# Patient Record
Sex: FEMALE | Race: WHITE | NOT HISPANIC OR LATINO | Employment: FULL TIME | URBAN - METROPOLITAN AREA
[De-identification: names, ages, dates, MRNs, and addresses within clinical notes are randomized per-mention and may not be internally consistent; named-entity substitution may affect disease eponyms.]

---

## 2017-01-27 ENCOUNTER — COMMUNICATION - HEALTHEAST (OUTPATIENT)
Dept: INTERNAL MEDICINE | Facility: CLINIC | Age: 42
End: 2017-01-27

## 2017-03-17 ENCOUNTER — COMMUNICATION - HEALTHEAST (OUTPATIENT)
Dept: INTERNAL MEDICINE | Facility: CLINIC | Age: 42
End: 2017-03-17

## 2017-06-23 ENCOUNTER — COMMUNICATION - HEALTHEAST (OUTPATIENT)
Dept: INTERNAL MEDICINE | Facility: CLINIC | Age: 42
End: 2017-06-23

## 2017-08-10 ENCOUNTER — COMMUNICATION - HEALTHEAST (OUTPATIENT)
Dept: INTERNAL MEDICINE | Facility: CLINIC | Age: 42
End: 2017-08-10

## 2017-09-07 ENCOUNTER — COMMUNICATION - HEALTHEAST (OUTPATIENT)
Dept: INTERNAL MEDICINE | Facility: CLINIC | Age: 42
End: 2017-09-07

## 2017-09-14 ENCOUNTER — OFFICE VISIT - HEALTHEAST (OUTPATIENT)
Dept: INTERNAL MEDICINE | Facility: CLINIC | Age: 42
End: 2017-09-14

## 2017-09-14 DIAGNOSIS — L21.9 SEBORRHEIC DERMATITIS OF SCALP: ICD-10-CM

## 2017-09-14 DIAGNOSIS — F34.1 DYSTHYMIA: ICD-10-CM

## 2017-09-14 DIAGNOSIS — F41.1 GAD (GENERALIZED ANXIETY DISORDER): ICD-10-CM

## 2017-09-14 DIAGNOSIS — I10 HTN (HYPERTENSION): ICD-10-CM

## 2017-09-14 LAB
CHOLEST SERPL-MCNC: 206 MG/DL
FASTING STATUS PATIENT QL REPORTED: YES
HDLC SERPL-MCNC: 67 MG/DL
LDLC SERPL CALC-MCNC: 128 MG/DL
TRIGL SERPL-MCNC: 56 MG/DL

## 2017-09-19 ENCOUNTER — COMMUNICATION - HEALTHEAST (OUTPATIENT)
Dept: INTERNAL MEDICINE | Facility: CLINIC | Age: 42
End: 2017-09-19

## 2017-09-21 ENCOUNTER — COMMUNICATION - HEALTHEAST (OUTPATIENT)
Dept: INTERNAL MEDICINE | Facility: CLINIC | Age: 42
End: 2017-09-21

## 2017-11-23 ENCOUNTER — COMMUNICATION - HEALTHEAST (OUTPATIENT)
Dept: INTERNAL MEDICINE | Facility: CLINIC | Age: 42
End: 2017-11-23

## 2017-12-21 ENCOUNTER — COMMUNICATION - HEALTHEAST (OUTPATIENT)
Dept: INTERNAL MEDICINE | Facility: CLINIC | Age: 42
End: 2017-12-21

## 2018-03-25 ENCOUNTER — COMMUNICATION - HEALTHEAST (OUTPATIENT)
Dept: INTERNAL MEDICINE | Facility: CLINIC | Age: 43
End: 2018-03-25

## 2018-03-25 DIAGNOSIS — F34.1 DYSTHYMIA: ICD-10-CM

## 2018-06-12 ENCOUNTER — COMMUNICATION - HEALTHEAST (OUTPATIENT)
Dept: INTERNAL MEDICINE | Facility: CLINIC | Age: 43
End: 2018-06-12

## 2018-06-12 DIAGNOSIS — R60.9 IDIOPATHIC EDEMA: ICD-10-CM

## 2018-08-31 ENCOUNTER — COMMUNICATION - HEALTHEAST (OUTPATIENT)
Dept: INTERNAL MEDICINE | Facility: CLINIC | Age: 43
End: 2018-08-31

## 2018-08-31 DIAGNOSIS — R60.9 IDIOPATHIC EDEMA: ICD-10-CM

## 2019-03-26 ENCOUNTER — COMMUNICATION - HEALTHEAST (OUTPATIENT)
Dept: INTERNAL MEDICINE | Facility: CLINIC | Age: 44
End: 2019-03-26

## 2019-03-26 DIAGNOSIS — F34.1 DYSTHYMIA: ICD-10-CM

## 2019-05-13 ENCOUNTER — COMMUNICATION - HEALTHEAST (OUTPATIENT)
Dept: INTERNAL MEDICINE | Facility: CLINIC | Age: 44
End: 2019-05-13

## 2019-05-13 DIAGNOSIS — F34.1 DYSTHYMIA: ICD-10-CM

## 2019-06-24 ENCOUNTER — COMMUNICATION - HEALTHEAST (OUTPATIENT)
Dept: INTERNAL MEDICINE | Facility: CLINIC | Age: 44
End: 2019-06-24

## 2019-06-25 ENCOUNTER — OFFICE VISIT - HEALTHEAST (OUTPATIENT)
Dept: INTERNAL MEDICINE | Facility: CLINIC | Age: 44
End: 2019-06-25

## 2019-06-25 DIAGNOSIS — F41.1 GAD (GENERALIZED ANXIETY DISORDER): ICD-10-CM

## 2019-06-25 DIAGNOSIS — R60.9 IDIOPATHIC EDEMA: ICD-10-CM

## 2019-06-25 DIAGNOSIS — F34.1 DYSTHYMIA: ICD-10-CM

## 2019-06-25 LAB
ANION GAP SERPL CALCULATED.3IONS-SCNC: 12 MMOL/L (ref 5–18)
BUN SERPL-MCNC: 15 MG/DL (ref 8–22)
CALCIUM SERPL-MCNC: 10.5 MG/DL (ref 8.5–10.5)
CHLORIDE BLD-SCNC: 103 MMOL/L (ref 98–107)
CO2 SERPL-SCNC: 25 MMOL/L (ref 22–31)
CREAT SERPL-MCNC: 0.78 MG/DL (ref 0.6–1.1)
GFR SERPL CREATININE-BSD FRML MDRD: >60 ML/MIN/1.73M2
GLUCOSE BLD-MCNC: 114 MG/DL (ref 70–125)
POTASSIUM BLD-SCNC: 3.5 MMOL/L (ref 3.5–5)
SODIUM SERPL-SCNC: 140 MMOL/L (ref 136–145)

## 2019-12-15 ENCOUNTER — COMMUNICATION - HEALTHEAST (OUTPATIENT)
Dept: INTERNAL MEDICINE | Facility: CLINIC | Age: 44
End: 2019-12-15

## 2019-12-15 DIAGNOSIS — F34.1 DYSTHYMIA: ICD-10-CM

## 2019-12-17 ENCOUNTER — OFFICE VISIT - HEALTHEAST (OUTPATIENT)
Dept: INTERNAL MEDICINE | Facility: CLINIC | Age: 44
End: 2019-12-17

## 2019-12-17 DIAGNOSIS — L21.9 SEBORRHEIC DERMATITIS OF SCALP: ICD-10-CM

## 2019-12-17 DIAGNOSIS — R07.1 CHEST PAIN ON BREATHING: ICD-10-CM

## 2019-12-17 DIAGNOSIS — R60.9 IDIOPATHIC EDEMA: ICD-10-CM

## 2019-12-17 DIAGNOSIS — Z12.31 VISIT FOR SCREENING MAMMOGRAM: ICD-10-CM

## 2020-02-20 ENCOUNTER — OFFICE VISIT - HEALTHEAST (OUTPATIENT)
Dept: INTERNAL MEDICINE | Facility: CLINIC | Age: 45
End: 2020-02-20

## 2020-02-20 DIAGNOSIS — R60.9 IDIOPATHIC EDEMA: ICD-10-CM

## 2020-02-20 DIAGNOSIS — F41.1 GAD (GENERALIZED ANXIETY DISORDER): ICD-10-CM

## 2020-02-20 DIAGNOSIS — F34.1 DYSTHYMIA: ICD-10-CM

## 2020-02-20 DIAGNOSIS — Z00.00 ANNUAL PHYSICAL EXAM: ICD-10-CM

## 2020-02-20 LAB
ANION GAP SERPL CALCULATED.3IONS-SCNC: 11 MMOL/L (ref 5–18)
BUN SERPL-MCNC: 15 MG/DL (ref 8–22)
CALCIUM SERPL-MCNC: 9.7 MG/DL (ref 8.5–10.5)
CHLORIDE BLD-SCNC: 103 MMOL/L (ref 98–107)
CO2 SERPL-SCNC: 24 MMOL/L (ref 22–31)
CREAT SERPL-MCNC: 0.8 MG/DL (ref 0.6–1.1)
GFR SERPL CREATININE-BSD FRML MDRD: >60 ML/MIN/1.73M2
GLUCOSE BLD-MCNC: 95 MG/DL (ref 70–125)
POTASSIUM BLD-SCNC: 3.8 MMOL/L (ref 3.5–5)
SODIUM SERPL-SCNC: 138 MMOL/L (ref 136–145)

## 2020-02-20 ASSESSMENT — ANXIETY QUESTIONNAIRES
4. TROUBLE RELAXING: SEVERAL DAYS
6. BECOMING EASILY ANNOYED OR IRRITABLE: MORE THAN HALF THE DAYS
IF YOU CHECKED OFF ANY PROBLEMS ON THIS QUESTIONNAIRE, HOW DIFFICULT HAVE THESE PROBLEMS MADE IT FOR YOU TO DO YOUR WORK, TAKE CARE OF THINGS AT HOME, OR GET ALONG WITH OTHER PEOPLE: SOMEWHAT DIFFICULT
7. FEELING AFRAID AS IF SOMETHING AWFUL MIGHT HAPPEN: SEVERAL DAYS
1. FEELING NERVOUS, ANXIOUS, OR ON EDGE: SEVERAL DAYS
3. WORRYING TOO MUCH ABOUT DIFFERENT THINGS: SEVERAL DAYS
2. NOT BEING ABLE TO STOP OR CONTROL WORRYING: SEVERAL DAYS
5. BEING SO RESTLESS THAT IT IS HARD TO SIT STILL: SEVERAL DAYS
GAD7 TOTAL SCORE: 8

## 2020-02-20 ASSESSMENT — MIFFLIN-ST. JEOR: SCORE: 1269.11

## 2020-02-20 ASSESSMENT — PATIENT HEALTH QUESTIONNAIRE - PHQ9: SUM OF ALL RESPONSES TO PHQ QUESTIONS 1-9: 9

## 2020-02-21 LAB
HPV SOURCE: NORMAL
HUMAN PAPILLOMA VIRUS 16 DNA: NEGATIVE
HUMAN PAPILLOMA VIRUS 18 DNA: NEGATIVE
HUMAN PAPILLOMA VIRUS FINAL DIAGNOSIS: NORMAL
HUMAN PAPILLOMA VIRUS OTHER HR: NEGATIVE
SPECIMEN DESCRIPTION: NORMAL

## 2020-02-24 ENCOUNTER — COMMUNICATION - HEALTHEAST (OUTPATIENT)
Dept: INTERNAL MEDICINE | Facility: CLINIC | Age: 45
End: 2020-02-24

## 2020-02-26 LAB
BKR LAB AP ABNORMAL BLEEDING: NO
BKR LAB AP BIRTH CONTROL/HORMONES: NORMAL
BKR LAB AP CERVICAL APPEARANCE: NORMAL
BKR LAB AP GYN ADEQUACY: NORMAL
BKR LAB AP GYN INTERPRETATION: NORMAL
BKR LAB AP HPV REFLEX: NORMAL
BKR LAB AP LMP: NORMAL
BKR LAB AP PATIENT STATUS: NORMAL
BKR LAB AP PREVIOUS ABNORMAL: NORMAL
BKR LAB AP PREVIOUS NORMAL: 2014
HIGH RISK?: NO
PATH REPORT.COMMENTS IMP SPEC: NORMAL
RESULT FLAG (HE HISTORICAL CONVERSION): NORMAL

## 2020-02-27 ENCOUNTER — HOSPITAL ENCOUNTER (OUTPATIENT)
Dept: MAMMOGRAPHY | Facility: CLINIC | Age: 45
Discharge: HOME | End: 2020-02-27

## 2020-02-27 DIAGNOSIS — Z12.31 VISIT FOR SCREENING MAMMOGRAM: ICD-10-CM

## 2020-06-25 ENCOUNTER — COMMUNICATION - HEALTHEAST (OUTPATIENT)
Dept: INTERNAL MEDICINE | Facility: CLINIC | Age: 45
End: 2020-06-25

## 2020-06-25 DIAGNOSIS — L21.9 SEBORRHEIC DERMATITIS OF SCALP: ICD-10-CM

## 2020-06-25 RX ORDER — KETOCONAZOLE 20 MG/ML
SHAMPOO TOPICAL
Qty: 120 ML | Refills: 3 | Status: SHIPPED | OUTPATIENT
Start: 2020-06-25 | End: 2022-02-28

## 2020-12-31 ENCOUNTER — COMMUNICATION - HEALTHEAST (OUTPATIENT)
Dept: INTERNAL MEDICINE | Facility: CLINIC | Age: 45
End: 2020-12-31

## 2020-12-31 DIAGNOSIS — R60.9 IDIOPATHIC EDEMA: ICD-10-CM

## 2021-02-23 ENCOUNTER — COMMUNICATION - HEALTHEAST (OUTPATIENT)
Dept: INTERNAL MEDICINE | Facility: CLINIC | Age: 46
End: 2021-02-23

## 2021-02-23 DIAGNOSIS — F34.1 DYSTHYMIA: ICD-10-CM

## 2021-02-23 DIAGNOSIS — R60.9 IDIOPATHIC EDEMA: ICD-10-CM

## 2021-02-24 ENCOUNTER — OFFICE VISIT - HEALTHEAST (OUTPATIENT)
Dept: INTERNAL MEDICINE | Facility: CLINIC | Age: 46
End: 2021-02-24

## 2021-02-24 DIAGNOSIS — Z12.31 VISIT FOR SCREENING MAMMOGRAM: ICD-10-CM

## 2021-02-24 DIAGNOSIS — F41.1 GAD (GENERALIZED ANXIETY DISORDER): ICD-10-CM

## 2021-02-24 DIAGNOSIS — R60.9 IDIOPATHIC EDEMA: ICD-10-CM

## 2021-02-24 DIAGNOSIS — F34.1 DYSTHYMIA: ICD-10-CM

## 2021-02-24 RX ORDER — LORAZEPAM 0.5 MG/1
0.5 TABLET ORAL EVERY 8 HOURS PRN
Qty: 15 TABLET | Refills: 0 | Status: SHIPPED | OUTPATIENT
Start: 2021-02-24

## 2021-02-24 RX ORDER — TRIAMTERENE AND HYDROCHLOROTHIAZIDE 75; 50 MG/1; MG/1
1 TABLET ORAL DAILY
Qty: 90 TABLET | Refills: 3 | Status: SHIPPED | OUTPATIENT
Start: 2021-02-24 | End: 2022-02-23

## 2021-03-23 ENCOUNTER — AMBULATORY - HEALTHEAST (OUTPATIENT)
Dept: LAB | Facility: CLINIC | Age: 46
End: 2021-03-23

## 2021-03-23 DIAGNOSIS — R60.9 IDIOPATHIC EDEMA: ICD-10-CM

## 2021-03-23 LAB
ANION GAP SERPL CALCULATED.3IONS-SCNC: 10 MMOL/L (ref 5–18)
BUN SERPL-MCNC: 14 MG/DL (ref 8–22)
CALCIUM SERPL-MCNC: 9.1 MG/DL (ref 8.5–10.5)
CHLORIDE BLD-SCNC: 102 MMOL/L (ref 98–107)
CHOLEST SERPL-MCNC: 199 MG/DL
CO2 SERPL-SCNC: 24 MMOL/L (ref 22–31)
CREAT SERPL-MCNC: 0.79 MG/DL (ref 0.6–1.1)
FASTING STATUS PATIENT QL REPORTED: YES
GFR SERPL CREATININE-BSD FRML MDRD: >60 ML/MIN/1.73M2
GLUCOSE BLD-MCNC: 90 MG/DL (ref 70–125)
HDLC SERPL-MCNC: 65 MG/DL
LDLC SERPL CALC-MCNC: 112 MG/DL
POTASSIUM BLD-SCNC: 3.5 MMOL/L (ref 3.5–5)
SODIUM SERPL-SCNC: 136 MMOL/L (ref 136–145)
TRIGL SERPL-MCNC: 110 MG/DL

## 2021-05-26 ENCOUNTER — RECORDS - HEALTHEAST (OUTPATIENT)
Dept: ADMINISTRATIVE | Facility: CLINIC | Age: 46
End: 2021-05-26

## 2021-05-27 ASSESSMENT — PATIENT HEALTH QUESTIONNAIRE - PHQ9: SUM OF ALL RESPONSES TO PHQ QUESTIONS 1-9: 9

## 2021-05-27 NOTE — TELEPHONE ENCOUNTER
RN cannot approve Refill Request    RN can NOT refill this medication PCP messaged that patient is overdue for Office Visit.     Cecy Lutz, [facility] Triage/Med Refill 3/26/2019    Requested Prescriptions   Pending Prescriptions Disp Refills     sertraline (ZOLOFT) 50 MG tablet [Pharmacy Med Name: SERTRALINE 50MG TABLETS] 90 tablet 0     Sig: TAKE 1 TABLET(50 MG) BY MOUTH DAILY    [facility] Refill Protocol  Failed - 3/26/2019 10:46 AM       Failed - PCP or prescribing provider visit in last year    Last office visit with prescriber/PCP: 3/17/2016 Gabriel Connelly MD OR same dept: Visit date not found OR same specialty: 9/14/2017 Nelly Haque FNP  Last physical: Visit date not found Last MTM visit: Visit date not found   Next visit within 3 mo: Visit date not found  Next physical within 3 mo: Visit date not found  Prescriber OR PCP: Gabriel Connelly MD  Last diagnosis associated with med order: 1. Dysthymia  - sertraline (ZOLOFT) 50 MG tablet [Pharmacy Med Name: SERTRALINE 50MG TABLETS]; TAKE 1 TABLET(50 MG) BY MOUTH DAILY  Dispense: 90 tablet; Refill: 0    If protocol passes may refill for 12 months if within 3 months of last provider visit (or a total of 15 months).

## 2021-05-28 ASSESSMENT — ANXIETY QUESTIONNAIRES: GAD7 TOTAL SCORE: 8

## 2021-05-29 ENCOUNTER — RECORDS - HEALTHEAST (OUTPATIENT)
Dept: ADMINISTRATIVE | Facility: CLINIC | Age: 46
End: 2021-05-29

## 2021-05-29 NOTE — TELEPHONE ENCOUNTER
Noted. No refill sent since this is a controlled medication and last appointment was September 2017

## 2021-05-29 NOTE — TELEPHONE ENCOUNTER
Called to pt and pt states that she has appt for after her trip.  Pt would like refill at this time.  Pt was informed that she would need OV states that she can not come in prior to leaving town.  Informed pt that we have been telling her of need for appointment since 03/26/19 refill request.  Pt states that she can never get through to schedule an appointment.  Pt has appt scheduled.  Will address medications at that visit.

## 2021-05-29 NOTE — TELEPHONE ENCOUNTER
Medication Request  Medication name:     LORazepam (ATIVAN) 0.5 MG tablet        Sig - Route: Take 0.5 mg by mouth every 8 (eight) hours as needed for anxiety. - Oral    Class: Historical Med        Pharmacy Name and Location: [id number] - [place], [place]  Reason for request: The patient is requesting a fill of this medication. The patient would like the fill request to be expedited due to leaving out of town Wednesday 6/26/2019.   When did you use medication last?:  2 months ago approximately  Patient offered appointment:  The patient states that she will see Nelly Haque when she returns after the script is filled. The patient was transferred to appointment scheduling.The patient was transferred to appointment scheduling.   Okay to leave a detailed message: yes

## 2021-05-30 NOTE — PROGRESS NOTES
Internal Medicine Office Visit  [facility]  Patient Name: Triny Corcoran  Patient Age: 43 y.o.  YOB: 1975  MRN: [id number]    Date of Visit: 2019  Reason for Office Visit:   Chief Complaint   Patient presents with     Medication Management           Assessment / Plan / Medical Decision Makin. Idiopathic edema  - CONTINUE: triamterene-hydrochlorothiazide (MAXZIDE) 75-50 mg per tablet; Take 1 tablet by mouth daily.  Dispense: 90 tablet; Refill: 1  - Basic Metabolic Panel    2. EVELYN (generalized anxiety disorder)  3. Dysthymia  - Continue current medications  - Advised that she needs to be seen every 6 months while taking these medications   - uses sparingly-- LORazepam (ATIVAN) 0.5 MG tablet; Take 1 tablet (0.5 mg total) by mouth every 8 (eight) hours as needed for anxiety.  Dispense: 15 tablet; Refill: 0  - RESTART: sertraline (ZOLOFT) 50 MG tablet; Take 1 tablet (50 mg total) by mouth daily.  Dispense: 90 tablet; Refill: 1-- advised 1/2 tablet x 1 week then increase to 1 tablet daily thereafter     She is due for a PAP smear and baseline mammogram. Advised a physical in the next 6 months, she intends to call to schedule     Health Maintenance Review  Health Maintenance   Topic Date Due     ADVANCE DIRECTIVES DISCUSSED WITH PATIENT  1992     PAP SMEAR  2019     INFLUENZA VACCINE RULE BASED (Season Ended) 2019     DEPRESSION FOLLOW UP  2019     TD 18+ HE  2029     TDAP ADULT ONE TIME DOSE  Completed         I have changed Triny Corcoran's LORazepam and sertraline. I am also having her maintain her ketoconazole and triamterene-hydrochlorothiazide.      HPI:  Triny Corcoran is a 43 y.o. year old who presents to the office today for follow up of EVELYN and dysthymia. She did not take either lorazepam or sertraline for some time but about 5 months ago restarted sertraline. Her  can tell that her mood is improved and she  agrees. She ran out of sertraline 1 month ago and realized that she needed to restart the medication. Her daughter is now age 17 and they have a good relationship. Her son is 13 and she is upset by his moodiness sometimes. She takes lorazepam sparingly for anxiety.     She has a history of LE edema and has been taking triamterene-HCT for this with well controlled symptoms and no lightheadedness/dizziness.     Review of Systems- pertinent positive in bold:  A 10-point ROS is negative except as stated in HPI         Current Scheduled Meds:  Outpatient Encounter Medications as of 6/25/2019   Medication Sig Dispense Refill     ketoconazole (NIZORAL) 2 % shampoo APPLY TOPICALLY TO DAMP SKIN, LATHER, LEAVE ON FOR 5 MINUTES THEN RINSE. TREAT FOR 2 TO 3 WEEKS AS NEEDED 120 mL 3     sertraline (ZOLOFT) 50 MG tablet Take 1 tablet (50 mg total) by mouth daily. 90 tablet 1     triamterene-hydrochlorothiazide (MAXZIDE) 75-50 mg per tablet Take 1 tablet by mouth daily. 90 tablet 1     [DISCONTINUED] LORazepam (ATIVAN) 0.5 MG tablet Take 0.5 mg by mouth every 8 (eight) hours as needed for anxiety.       [DISCONTINUED] sertraline (ZOLOFT) 50 MG tablet Take 1 tablet (50 mg total) by mouth daily. Refill declined, needs appointment 0.1 tablet 0     [DISCONTINUED] triamterene-hydrochlorothiazide (MAXZIDE) 75-50 mg per tablet TAKE 1 TABLET BY MOUTH DAILY 90 tablet 0     [DISCONTINUED] triamterene-hydrochlorothiazide (MAXZIDE) 75-50 mg per tablet Take 1 tablet by mouth daily. Due for a med check visit in mid-September.Call 24/7 90 tablet 3     LORazepam (ATIVAN) 0.5 MG tablet Take 1 tablet (0.5 mg total) by mouth every 8 (eight) hours as needed for anxiety. 15 tablet 0     No facility-administered encounter medications on file as of 6/25/2019.      Past Medical History:   Diagnosis Date     Anxiety      H/O: depression      Idiopathic edema      S/P colonoscopy 10/17/2011     Past Surgical History:   Procedure Laterality Date      "TONSILLECTOMY       Social History     Tobacco Use     Smoking status: Never Smoker   Substance Use Topics     Alcohol use: Not on file     Comment: rare, less than 1 drink per week      Drug use: No       Objective / Physical Examination:  Vitals:    06/25/19 1142   BP: 100/60   Pulse: 74   Height: 5' 4\" (1.626 m)     Wt Readings from Last 3 Encounters:   12/09/16 130 lb (59 kg)   09/09/16 136 lb 12.8 oz (62.1 kg)   03/17/16 140 lb 6.4 oz (63.7 kg)     Body mass index is 22.31 kg/m .     General Appearance: Alert and oriented, cooperative, affect appropriate, speech clear, in no apparent distress  Lungs: Clear to auscultation bilaterally. Normal inspiratory and expiratory effort  Cardiovascular: Regular rate, normal S1, S2. No murmurs, rubs, or gallops  Extremities:  No edema  Psych: mood is not anxious or depressed.     Orders Placed This Encounter   Procedures     Td, Preservative Free (green label)     Basic Metabolic Panel   Followup: Return in about 6 months (around 12/25/2019) for Annual physical. earlier if needed.        Nelly Haque, CNP    "

## 2021-06-02 ENCOUNTER — RECORDS - HEALTHEAST (OUTPATIENT)
Dept: ADMINISTRATIVE | Facility: CLINIC | Age: 46
End: 2021-06-02

## 2021-06-03 VITALS — BODY MASS INDEX: 22.31 KG/M2 | HEIGHT: 64 IN

## 2021-06-04 VITALS
WEIGHT: 142 LBS | BODY MASS INDEX: 24.24 KG/M2 | SYSTOLIC BLOOD PRESSURE: 120 MMHG | HEART RATE: 62 BPM | HEIGHT: 64 IN | DIASTOLIC BLOOD PRESSURE: 60 MMHG

## 2021-06-04 VITALS — DIASTOLIC BLOOD PRESSURE: 62 MMHG | SYSTOLIC BLOOD PRESSURE: 122 MMHG | HEART RATE: 74 BPM

## 2021-06-04 NOTE — TELEPHONE ENCOUNTER
Refill Approved    Rx renewed per Medication Renewal Policy. Medication was last renewed on 6/25/19.    Cecy Lutz, [facility] Triage/Med Refill 12/16/2019     Requested Prescriptions   Pending Prescriptions Disp Refills     sertraline (ZOLOFT) 50 MG tablet [Pharmacy Med Name: SERTRALINE 50MG TABLETS] 90 tablet 0     Sig: TAKE 1 TABLET BY MOUTH DAILY       [facility] Refill Protocol  Passed - 12/15/2019  9:26 AM        Passed - PCP or prescribing provider visit in last year     Last office visit with prescriber/PCP: 6/25/2019 Nelly Haque FNP OR same dept: 6/25/2019 Nelly Haque FNP OR same specialty: 6/25/2019 Nelly Haque FNP  Last physical: Visit date not found Last MTM visit: Visit date not found   Next visit within 3 mo: Visit date not found  Next physical within 3 mo: Visit date not found  Prescriber OR PCP: PARISH Salgado  Last diagnosis associated with med order: 1. Dysthymia  - sertraline (ZOLOFT) 50 MG tablet [Pharmacy Med Name: SERTRALINE 50MG TABLETS]; TAKE 1 TABLET BY MOUTH DAILY  Dispense: 90 tablet; Refill: 0    If protocol passes may refill for 12 months if within 3 months of last provider visit (or a total of 15 months).

## 2021-06-04 NOTE — PROGRESS NOTES
Internal Medicine Office Visit  [facility]   Patient Name: Triny Corcoran  Patient Age: 44 y.o.  YOB: 1975  MRN: [id number]    Date of Visit: 2019  Reason for Office Visit:   Chief Complaint   Patient presents with     Follow-up     chest pains doing better           Assessment / Plan / Medical Decision Makin. Chest pain on breathing  -Notes from emergency room visit were reviewed through Care Everywhere.  She does not have typical angina symptoms.  Symptoms have improved since she was seen.  We reviewed the option to do a stress test, she declines.  She would like to first he how things go over the next couple of weeks.  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  She will return for a physical in January and reevaluate symptoms at that time    2. Idiopathic edema  - triamterene-hydrochlorothiazide (MAXZIDE) 75-50 mg per tablet; Take 1 tablet by mouth daily.  Dispense: 90 tablet; Refill: 1    3. Seborrheic dermatitis of scalp  - START: ketoconazole (NIZORAL) 2 % shampoo; APPLY TOPICALLY TO DAMP SKIN, LATHER, LEAVE ON FOR 5 MINUTES THEN RINSE. TREAT FOR 2 TO 3 WEEKS AS NEEDED  Dispense: 120 mL; Refill: 3    4. Visit for screening mammogram  - Mammo Screening Bilateral; Future        Health Maintenance Review  Health Maintenance   Topic Date Due     DEPRESSION ACTION PLAN  1975     PREVENTIVE CARE VISIT  1975     MAMMOGRAM  1974     HIV SCREENING  1989     ADVANCE CARE PLANNING  1992     PAP SMEAR  2019     HPV TEST  2019     INFLUENZA VACCINE RULE BASED (1) 2019     LIPID  2022     TD 18+ HE  2029     TDAP ADULT ONE TIME DOSE  Completed         I am having Triny Corcoran maintain her LORazepam, sertraline, triamterene-hydrochlorothiazide, and ketoconazole.      HPI:  Triny Corcoran is a 44 y.o. year old who presents to the office today for  follow-up of her recent emergency department visit.  She was seen on 12/15 at [facility] emergency room due to a complaint of left chest pain which worsened with breathing in and out.  EKG was normal troponin negative, d-dimer negative.  She was discharged to follow-up.  She has been able to teach dance and 1 day noticed minimal symptoms but otherwise has not had pain.  She can notice this if she bends forward sometimes.  She does note that she had some coughing prior to going to the emergency department.  There is a family history of cardiac disease but after age 50 and there were other lifestyle risk factors such as smoking and obesity.    She has scaling, flaking, and itching on the scalp.  She asks about a treatment for this.    Review of Systems- pertinent positive in bold:  Negative for diaphoresis, dyspnea      Current Scheduled Meds:  Outpatient Encounter Medications as of 12/17/2019   Medication Sig Dispense Refill     ketoconazole (NIZORAL) 2 % shampoo APPLY TOPICALLY TO DAMP SKIN, LATHER, LEAVE ON FOR 5 MINUTES THEN RINSE. TREAT FOR 2 TO 3 WEEKS AS NEEDED 120 mL 3     LORazepam (ATIVAN) 0.5 MG tablet Take 1 tablet (0.5 mg total) by mouth every 8 (eight) hours as needed for anxiety. 15 tablet 0     sertraline (ZOLOFT) 50 MG tablet TAKE 1 TABLET BY MOUTH DAILY 90 tablet 1     [DISCONTINUED] ketoconazole (NIZORAL) 2 % shampoo APPLY TOPICALLY TO DAMP SKIN, LATHER, LEAVE ON FOR 5 MINUTES THEN RINSE. TREAT FOR 2 TO 3 WEEKS AS NEEDED 120 mL 3     [DISCONTINUED] triamterene-hydrochlorothiazide (MAXZIDE) 75-50 mg per tablet Take 1 tablet by mouth daily. 90 tablet 1     triamterene-hydrochlorothiazide (MAXZIDE) 75-50 mg per tablet Take 1 tablet by mouth daily. 90 tablet 1     No facility-administered encounter medications on file as of 12/17/2019.        Past Medical History:   Diagnosis Date     Anxiety      H/O: depression      Idiopathic edema      S/P colonoscopy 10/17/2011     Past Surgical History:   Procedure  Laterality Date     TONSILLECTOMY       Social History     Tobacco Use     Smoking status: Never Smoker   Substance Use Topics     Alcohol use: Not on file     Comment: rare, less than 1 drink per week      Drug use: No       Objective / Physical Examination:  Vitals:    12/17/19 1343   BP: 122/62   Pulse: 74     Wt Readings from Last 3 Encounters:   12/09/16 130 lb (59 kg)   09/09/16 136 lb 12.8 oz (62.1 kg)   03/17/16 140 lb 6.4 oz (63.7 kg)     There is no height or weight on file to calculate BMI.     Constitutional: In no apparent distress  ENT: Septum midline, nares patent, no visible polyps, mucosa moist and without drainage. Lips and mucosa moist. Pharynx without erythema or exudate. Neck is supple, trachea midline. No cervical adenopathy  Respiratory: Clear to auscultation bilaterally. Normal inspiratory and expiratory effort  Cardiovascular: Regular rate and rhythm. No murmurs, rubs, or gallops  Chest wall: no pain to palpation   Gastrointestinal: Bowel sounds active all four quadrants. Soft, non-tender.       Orders Placed This Encounter   Procedures     Mammo Screening Bilateral   Followup: Return in about 4 weeks (around 1/14/2020) for Annual physical, Next scheduled follow up. earlier if needed.        Nelly Haque, CNP

## 2021-06-06 NOTE — PROGRESS NOTES
Assessment/Plan:     1. Annual physical exam  - Reviewed the importance of monitoring for changes in breast appearance such as nipple inversion, changes in breast tissue texture, non-healing wounds, or nipple discharge   - Basic Metabolic Panel  - Gynecologic Cytology (PAP Smear)  - HPV High Risk DNA Cervical    2. Idiopathic edema  Well controlled with current medication   - Basic Metabolic Panel  - triamterene-hydrochlorothiazide (MAXZIDE) 75-50 mg per tablet; Take 1 tablet by mouth daily.  Dispense: 90 tablet; Refill: 1    3. EVELYN (generalized anxiety disorder)  4. Dysthymia  stable  - sertraline (ZOLOFT) 50 MG tablet; Take 1 tablet (50 mg total) by mouth daily.  Dispense: 90 tablet; Refill: 1        Subjective:     Triny Corcorna is a 44 y.o. female who presents for an annual exam.     Dysthymia/EVELYN is treated with sertraline which has been effective for her. Her  was recently laid off which has her upset but she feels that she is coping well.    Her insurance will lapse at the end of the month due to loss of her spouse's insurance.     She has idiopathic edema treated with triamterene-HCTZ which has worked well.     Her right breast always has a bruised feeling but otherwise she does not note breast changes.     The patient reports that there is not domestic violence in her life.     Healthy Habits:   Regular Exercise: [facility]   Sunscreen Use: Yes  Healthy Diet: Yes, doesn't drink enough water  Dental Visits Regularly: Yes  Sexually active: Yes  Mammogram: Scheduled for 2/27/20      Health Maintenance   Topic Date Due     DEPRESSION ACTION PLAN  1975     PREVENTIVE CARE VISIT  1975     MAMMOGRAM  1974     ADVANCE CARE PLANNING  07/30/1992     PAP SMEAR  04/03/2019     HPV TEST  04/03/2019     LIPID  09/14/2022     TD 18+ HE  06/25/2029     INFLUENZA VACCINE RULE BASED  Completed     TDAP ADULT ONE TIME DOSE  Completed     HIV SCREENING  Discontinued        Immunization History   Administered Date(s) Administered     Influenza,seasonal quad, PF, =/> 6months 02/20/2020     Td, adult adsorbed, PF 06/25/2019     Tdap 08/18/2008     Immunization status: up to date and documented.    Gynecologic History  No LMP recorded.  Contraception: none  Last Pap: 4/3/2014. Results were: normal HPV testing: negative     OB History   No obstetric history on file.       Current Outpatient Medications   Medication Sig Dispense Refill     ketoconazole (NIZORAL) 2 % shampoo APPLY TOPICALLY TO DAMP SKIN, LATHER, LEAVE ON FOR 5 MINUTES THEN RINSE. TREAT FOR 2 TO 3 WEEKS AS NEEDED 120 mL 3     LORazepam (ATIVAN) 0.5 MG tablet Take 1 tablet (0.5 mg total) by mouth every 8 (eight) hours as needed for anxiety. 15 tablet 0     sertraline (ZOLOFT) 50 MG tablet Take 1 tablet (50 mg total) by mouth daily. 90 tablet 1     triamterene-hydrochlorothiazide (MAXZIDE) 75-50 mg per tablet Take 1 tablet by mouth daily. 90 tablet 1     No current facility-administered medications for this visit.      Past Medical History:   Diagnosis Date     Anxiety      H/O: depression      Idiopathic edema      S/P colonoscopy 10/17/2011     Past Surgical History:   Procedure Laterality Date     TONSILLECTOMY       Patient has no known allergies.  Family History   Problem Relation Age of Onset     Melanoma Mother      Diabetes Father      No Medical Problems Brother      No Medical Problems Son      No Medical Problems Daughter      Heart attack Maternal Uncle 50     Stroke Maternal Uncle 60     Social History     Socioeconomic History     Marital status:      Spouse name: Not on file     Number of children: 2     Years of education: Not on file     Highest education level: Not on file   Occupational History     Occupation: Dance instructor   Social Needs     Financial resource strain: Not on file     Food insecurity:     Worry: Not on file     Inability: Not on file     Transportation needs:     Medical: Not  "on file     Non-medical: Not on file   Tobacco Use     Smoking status: Never Smoker   Substance and Sexual Activity     Alcohol use: Not on file     Comment: rare, less than 1 drink per week      Drug use: No     Sexual activity: Yes     Birth control/protection: None, Condom     Comment: 8/17/16: denies use of hormonal birthcontrol.   Lifestyle     Physical activity:     Days per week: Not on file     Minutes per session: Not on file     Stress: Not on file   Relationships     Social connections:     Talks on phone: Not on file     Gets together: Not on file     Attends Hinduism service: Not on file     Active member of club or organization: Not on file     Attends meetings of clubs or organizations: Not on file     Relationship status: Not on file     Intimate partner violence:     Fear of current or ex partner: Not on file     Emotionally abused: Not on file     Physically abused: Not on file     Forced sexual activity: Not on file   Other Topics Concern     Not on file   Social History Narrative     Not on file       Review of Systems  General:  Negative except as noted above  Eyes: Negative except as noted above  Ears/Nose/Throat: Negative except as noted above  Cardiovascular: Negative except as noted above  Respiratory:  Negative except as noted above  Gastrointestinal:  Negative except as noted above  Musculoskeletal:  Negative except as noted above  Skin: Negative except as noted above  Neurologic: Negative except as noted above  Psychiatric: Negative except as noted above  Endocrine: Negative except as noted above  Heme/Lymphatic: Negative except as noted above   Allergic/Immunologic: Negative except as noted above      Objective:      Vitals:    02/20/20 0818   BP: 120/60   Pulse: 62   Weight: 142 lb (64.4 kg)   Height: 5' 4\" (1.626 m)     Wt Readings from Last 3 Encounters:   02/20/20 142 lb (64.4 kg)   12/09/16 130 lb (59 kg)   09/09/16 136 lb 12.8 oz (62.1 kg)     Body mass index is 24.37 " kg/m .    Physical Exam:  General Appearance: Alert, cooperative, no distress.  Head: Normocephalic, without obvious abnormality, atraumatic  Eyes: PERRL, conjunctiva/corneas clear, EOM's intact  Ears: Normal TM's and external ear canals, both ears  Throat: Lips, mucosa, and tongue normal  Neck: Supple, symmetrical, trachea midline, no adenopathy;  thyroid: not enlarged, symmetric, no tenderness/mass/nodules  Lungs: Clear to auscultation bilaterally, respirations unlabored  Breasts: No breast masses, tenderness, asymmetry, or nipple discharge.  Heart: Regular rate and rhythm, S1 and S2 normal, no murmur, rub, or gallop  Abdomen: Soft, non-tender, bowel sounds active all four quadrants,  no masses, no organomegaly  Pelvic: Genital: EXTERNAL GENITALIA: Normal appearing vulva without masses, tenderness or lesions. PERINEUM: normal and intact. URETHRAL MEATUS: normal VAGINA:  vagina with normal color and without discharge or lesions. CERVIX: normal appearing cervix without discharge or lesions. Non-friable. No CMT. UTERUS: uterus is normal size, shape, consistency, and non-tender. ADNEXA: no tenderness or fullness.   Extremities: Extremities normal, atraumatic, no cyanosis or edema  Skin: Skin color, texture, turgor normal, no rashes or lesions  Lymph nodes: Cervical, supraclavicular, and axillary nodes normal  Neurologic: Normal  Psych: Normal affect.  Does not appear anxious or depressed.

## 2021-06-09 NOTE — TELEPHONE ENCOUNTER
RN cannot approve Refill Request    RN can NOT refill this medication med is not covered by policy/route to provider     . Last office visit: 12/17/2019 Nelly Haque FNP Last Physical: 2/20/2020 Last MTM visit: Visit date not found Last visit same specialty: 12/17/2019 Nelly Haque FNP.  Next visit within 3 mo: Visit date not found  Next physical within 3 mo: Visit date not found      Cecy Lutz, [facility] Triage/Med Refill 6/25/2020    Requested Prescriptions   Pending Prescriptions Disp Refills     ketoconazole (NIZORAL) 2 % shampoo [Pharmacy Med Name: KETOCONAZOLE 2% SHAMPOO 120ML] 120 mL 3     Sig: APPLY TOPICALLY TO DAMP SKIN, LATHER, LEAVE ON FOR 5 MINUTES THEN RINSE. TREAT FOR 2 TO 3 WEEKS AS NEEDED       There is no refill protocol information for this order

## 2021-06-12 NOTE — PROGRESS NOTES
Internal Medicine Office Visit  Patient Name: Triny Corcoran  Patient Age: 42 y.o.  YOB: 1975  MRN: [id number]  ?  Date of Visit: 2017  Reason for Office Visit:   Chief Complaint   Patient presents with     Medication Management       Assessment / Plan / Medical Decision Makin. EVELYN (generalized anxiety disorder)  2. HTN (hypertension)  3. Dysthymia  4. Seborrheic dermatitis of scalp  - Start sertraline 25 mg daily x 1 week then increase to 50 mg daily thereafter. Referral to psychology for individual counseling   - Lorazepam as needed for anxiety, she uses this very infrequently and has a prescription from 1 year ago    - Suspect that episodes of bilateral shoulder pain are due to anxiety reaction  - Follow up in 4-6 weeks for EVELYN       Health Maintenance Review  Health Maintenance   Topic Date Due     DEPRESSION FOLLOW UP  1974     ADVANCE DIRECTIVES DISCUSSED WITH PATIENT  1992     INFLUENZA VACCINE RULE BASED (1) 2017     TD 18+ HE  2018     PAP SMEAR  2019     TDAP ADULT ONE TIME DOSE  Completed         I have discontinued Ms. Corcoran's sertraline. I am also having her start on sertraline. Additionally, I am having her maintain her LORazepam, triamterene-hydrochlorothiazide, and ketoconazole.     HPI:   Encounter Diagnoses   Name Primary?     EVELYN (generalized anxiety disorder) Yes     HTN (hypertension)      Dysthymia      Seborrheic dermatitis of scalp       Triny Corcoran is a 41 y/o female who presents to the office today for follow up.     Depression, EVELYN- she went to [facility] because she felt suidicidal at one point. She wanted to go to the hospital other times for depression. She has a 15 y/o son that is very difficult for her to parent. She has done family therapy, didn't feel that this was constructive. She saw a few therapists in the past for individual counseling but never felt like she took much from these visits either but admits that she  didn't give it much time.     She has a new concern of right and left arm numbness. This has occurred twice in the past. Took Advil and this went away. Her fingers get numb and tingly.     Hx of ovarian cysts, started at age 17. She has had monthly episodes of severe suprapubic pain. Went to ER once but left without being seen due to wait; symptoms eventually resolved.       Review of Systems: As in HPI      Current Scheduled Meds:  Outpatient Encounter Prescriptions as of 9/14/2017   Medication Sig Dispense Refill     ketoconazole (NIZORAL) 2 % shampoo APPLY TOPICALLY TO DAMP SKIN, LATHER, LEAVE ON FOR 5 MINUTES THEN RINSE. TREAT FOR 2 TO 3 WEEKS AS NEEDED 120 mL 3     triamterene-hydrochlorothiazide (MAXZIDE) 75-50 mg per tablet Take 1 tablet by mouth daily. 90 tablet 3     [DISCONTINUED] ketoconazole (NIZORAL) 2 % shampoo APPLY TOPICALLY TO DAMP SKIN, LATHER, LEAVE ON FOR 5 MINUTES THEN RINSE. TREAT FOR 2 TO 3 WEEKS AS NEEDED 120 mL 0     [DISCONTINUED] triamterene-hydrochlorothiazide (MAXZIDE) 75-50 mg per tablet Take 1 tablet by mouth daily. 90 tablet 0     LORazepam (ATIVAN) 0.5 MG tablet Take 0.5 mg by mouth every 8 (eight) hours as needed for anxiety.       sertraline (ZOLOFT) 50 MG tablet Take 1/2 tablet x 1 week then increase to 1 tablet daily thereafter 30 tablet 1     [DISCONTINUED] sertraline (ZOLOFT) 25 MG tablet TAKE 1 TABLET(25 MG) BY MOUTH DAILY 30 tablet 4     No facility-administered encounter medications on file as of 9/14/2017.      Past Medical History:   Diagnosis Date     Anxiety      H/O: depression      Idiopathic edema      S/P colonoscopy 10/17/2011     Past Surgical History:   Procedure Laterality Date     TONSILLECTOMY       Social History   Substance Use Topics     Smoking status: Never Smoker     Smokeless tobacco: None     Alcohol use None      Comment: rare, less than 1 drink per week        Objective / Physical Examination:  Vitals:    09/14/17 0817   BP: 104/64   Pulse: 70     Wt  Readings from Last 3 Encounters:   12/09/16 130 lb (59 kg)   09/09/16 136 lb 12.8 oz (62.1 kg)   03/17/16 140 lb 6.4 oz (63.7 kg)     There is no height or weight on file to calculate BMI.     General Appearance: Alert and oriented, cooperative, affect appropriate, speech clear, in no apparent distress  Lungs: Clear to auscultation bilaterally. Normal inspiratory and expiratory effort  Cardiovascular: Regular rate, normal S1, S2. No murmurs, rubs, or gallops  Abd: bowel sounds normal. No tenderness to palpation   Psych: normal fund of knowledge. Speech non-tangential. Somewhat depressed affect     Orders Placed This Encounter   Procedures     Comprehensive Metabolic Panel     Lipid Profile     [facility]     Ambulatory referral to Psychology   Followup: Return in about 4 weeks (around 10/12/2017) for Next scheduled follow up. earlier if needed.        Nelly Haque, CNP  [facility]

## 2021-06-15 NOTE — TELEPHONE ENCOUNTER
Called patient, has been over a year since she was seen    Unable to come into clinic now, would like to have a virtual med check    Scheduled tomorrow with Nelly for a video visit    Gloria Valadez, CMA

## 2021-06-15 NOTE — PROGRESS NOTES
Triny Corcoran is a 45 y.o. female who is being evaluated via a billable video visit.      How would you like to obtain your AVS? MyChart.  If dropped from the video visit, the video invitation should be resent by: Text to cell phone: [phone]  Will anyone else be joining your video visit? No    Internal Medicine Office Visit- VIDEO VISIT  [facility]   Patient Name: Triny Corcoran  Patient Age: 45 y.o.  YOB: 1975  MRN: [id number]      Video-Visit Details    Type of service:  Video Visit  Video Start Time: 12:46 PM  Video End Time (time video stopped): 1:00 PM  Originating Location (pt. Location): work     Distant Location (provider location):  [facility]     Mode of Communication:  Video Conference via [employer]      Date of Visit: 2021  Reason for Video Visit:   Chief Complaint   Patient presents with     medication check       Assessment / Plan / Medical Decision Makin. Idiopathic edema  - Lipid Profile; Future  - Basic Metabolic Panel; Future  - triamterene-hydrochlorothiazide (MAXZIDE) 75-50 mg per tablet; Take 1 tablet by mouth daily.  Dispense: 90 tablet; Refill: 3    2. EVELYN (generalized anxiety disorder)  3. Dysthymia  - LORazepam (ATIVAN) 0.5 MG tablet; Take 1 tablet (0.5 mg total) by mouth every 8 (eight) hours as needed for anxiety.  Dispense: 15 tablet; Refill: 0  - RESTART: sertraline (ZOLOFT) 50 MG tablet; Take 1 tablet (50 mg total) by mouth daily.  Dispense: 90 tablet; Refill: 3    4. Visit for screening mammogram  - Mammo Screening Bilateral; Future      Video visit duration: 14 minutes     Health Maintenance Review  Health Maintenance   Topic Date Due     DEPRESSION ACTION PLAN  1974     HEPATITIS C SCREENING  1974     ADVANCE CARE PLANNING  1992     INFLUENZA VACCINE RULE BASED (1) 2020     PREVENTIVE CARE VISIT  2021     MAMMOGRAM  2021     LIPID  2022     PAP SMEAR  2025      HPV TEST  02/20/2025     TD 18+ HE  06/25/2029     TDAP ADULT ONE TIME DOSE  Completed     Pneumococcal Vaccine: Pediatrics (0 to 5 Years) and At-Risk Patients (6 to 64 Years)  Aged Out     HEPATITIS B VACCINES  Aged Out     HIV SCREENING  Discontinued         I have changed Triny Corcoran's triamterene-hydrochlorothiazide. I am also having her maintain her ketoconazole, sertraline, and LORazepam.      HPI:  Triny Corcoran is 45 y.o. year old and was contacted today for a video visit.    She has been feeling more edgy recently. She stopped taking sertraline April 2020 because she didn't have insurance. She used to have lorazepam which she took very rarely. She asks for a refill of this medication.     LE edema is treated with triamterene-hydrochlorothiazide. No lightheadedness, dizziness.         Current Scheduled Meds:  Outpatient Encounter Medications as of 2/24/2021   Medication Sig Dispense Refill     ketoconazole (NIZORAL) 2 % shampoo APPLY TOPICALLY TO DAMP SKIN, LATHER, LEAVE ON FOR 5 MINUTES THEN RINSE. TREAT FOR 2 TO 3 WEEKS AS NEEDED 120 mL 3     triamterene-hydrochlorothiazide (MAXZIDE) 75-50 mg per tablet Take 1 tablet by mouth daily. 90 tablet 3     [DISCONTINUED] triamterene-hydrochlorothiazide (MAXZIDE) 75-50 mg per tablet Take 1 tablet by mouth daily. Due for follow up in February 90 tablet 0     LORazepam (ATIVAN) 0.5 MG tablet Take 1 tablet (0.5 mg total) by mouth every 8 (eight) hours as needed for anxiety. 15 tablet 0     sertraline (ZOLOFT) 50 MG tablet Take 1 tablet (50 mg total) by mouth daily. 90 tablet 3     [DISCONTINUED] LORazepam (ATIVAN) 0.5 MG tablet Take 1 tablet (0.5 mg total) by mouth every 8 (eight) hours as needed for anxiety. 15 tablet 0     [DISCONTINUED] sertraline (ZOLOFT) 50 MG tablet Take 1 tablet (50 mg total) by mouth daily. 90 tablet 1     No facility-administered encounter medications on file as of 2/24/2021.        Past Medical History:   Diagnosis Date      Anxiety      H/O: depression      Idiopathic edema      S/P colonoscopy 10/17/2011     Past Surgical History:   Procedure Laterality Date     TONSILLECTOMY       Social History     Tobacco Use     Smoking status: Never Smoker   Substance Use Topics     Alcohol use: Not on file     Comment: rare, less than 1 drink per week      Drug use: No       Objective / Physical Examination:  Insight is good. Thought process is logical.     Orders Placed This Encounter   Procedures     Mammo Screening Bilateral     Lipid Profile     Basic Metabolic Panel   Followup: Return in about 6 months (around 8/24/2021) for Next scheduled follow up. earlier if needed.

## 2021-06-15 NOTE — TELEPHONE ENCOUNTER
Refill Request  Did you contact pharmacy: No  Medication name: sertraline and triamterene/hctz  Requested Prescriptions      No prescriptions requested or ordered in this encounter     Who prescribed the medication: Nelly Haque  [facility]ed [facility]  Is patient out of medication: Yes  Patient notified refills processed in 3 business days:  yes  Okay to leave a detailed message: yes         Nelly Haque  Patient is requesting a refill of her sertraline and her traimterene/hctz.  She last saw you on 2/20/20.  She uses [employer] in the computer.

## 2021-06-19 NOTE — LETTER
Letter by Nelly Haque FNP at      Author: Nelly Haque FNP Service: -- Author Type: --    Filed:  Encounter Date: 6/25/2019 Status: (Other)         [facility]  06/25/19    Patient: Triny Corcoran  YOB: 1975  Medical Record Number: [id number]  CSN: [id number]                                                                              Non-opioid Controlled Substance Agreement    I understand that my care provider has prescribed a controlled substance to help manage my condition(s). I am taking this medicine to help me function or work. I know this is strong medicine, and that it can cause serious side effects. Controlled substances can be sedating, addicting and may cause a dependency on the drug. They can affect my ability to drive or think, and cause depression. They need to be taken exactly as prescribed. Combining controlled substances with certain medicines or chemicals (such as cocaine, sedatives and tranquilizers, sleeping pills, meth) can be dangerous or even fatal. Also, if I stop controlled substances suddenly, I may have severe withdrawal symptoms.  If not helpful, I may be asked to stop them.    The risks, benefits, and side effects of these medicine(s) were explained to me. I agree that:    1. I will take part in other treatments as advised by my care team. This may be psychiatry or counseling, physical therapy, behavioral therapy, group treatment or a referral to a pain clinic. I will reduce or stop my medicine when my care team tells me to do so.  2. I will take my medicines as prescribed. I will not change the dose or schedule unless my care team tells me to. There will be no refills if I run out early.  I may be contactedwithout warning and asked to complete a urine drug test or pill count at any time.   3. I will keep all my appointments, and understand this is part of the monitoring of controlled substances. My care team may require an office visit for  EVERY controlled substance refill. If I miss appointments or dont follow instructions, my care team may stop my medicine.  4. I will not ask other providers to prescribe controlled substances, and I will not accept controlled substances from other people. If I need another prescribed controlled substance for a new reason, I will tell my care team within 1 business day.  5. I will use one pharmacy to fill all of my controlled substance prescriptions, and it is up to me to make sure that I do not run out of my medicines on weekends or holidays. If my care team is willing to refill my controlled substance prescription without a visit, I must request refills only during office hours, refills may take up to 3 days to process, and it may take up to 5 to 7 days for my medicine to be mailed and ready at my pharmacy. Prescriptions will not be mailed anywhere except my pharmacy.    6. I am responsible for my prescriptions. If the medicine/prescription is lost or stolen, it will not be replaced. I also agree not to share controlled substance medicines with anyone.          [facility]  06/25/19  Patient:  Triny Corcoran  YOB: 1975  Medical Record Number: [id number]  CSN: [id number]    7. I agree to not use ANY illegal or recreational drugs. This includes marijuana, cocaine, bath salts or other drugs. I agree not to use alcohol unless my care team says I may. I agree to give urine samples whenever asked. If I dont give a urine sample, the care team may stop my medicine.    8. If I enroll in the [place] Medical Marijuana program, I will tell my care team. I will also sign an agreement to share my medical records with my care team.    9. I will bring in my list of medicines (or my medicine bottles) each time I come to the clinic.   10. I will tell my care team right away if I become pregnant or have a new medical problem treated outside of my regular clinic.  11. I understand that this medicine can  affect my thinking and judgment. It may be unsafe for me to drive, use machinery and do dangerous tasks. I will not do any of these things until I know how the medicine affects me. If my dose changes, I will wait to see how it affects me. I will contact my care team if I have concerns about medicine side effects.    I understand that if I do not follow any of the conditions above, my prescriptions or treatment may be stopped.      I agree that my provider, clinic care team, and pharmacy may work with any city, state or federal law enforcement agency that investigates the misuse, sale, or other diversion of my controlled medicine. I will allow my provider to discuss my care with or share a copy of this agreement with any other treating provider, pharmacy or emergency room where I receive care. I agree to give up (waive) any right of privacy or confidentiality with respect to these consents.   I have read this agreement and have asked questions about anything I did not understand.    ___________________________________________________________________________  Patient signature - Date/Time  -Triny Corcoran                                      ___________________________________________________________________________  Witness signature                                                                    ___________________________________________________________________________  Provider signature- PARISH Salgado

## 2021-06-20 NOTE — LETTER
Letter by Nelly Haque FNP at      Author: Nelly Haque FNP Service: -- Author Type: --    Filed:  Encounter Date: 2/24/2020 Status: (Other)         Triny Corcoran  [address] [address]             February 24, 2020         Dear Ms. Corcoran,    Below are the results from your recent visit:    Resulted Orders   Basic Metabolic Panel   Result Value Ref Range    Sodium 138 136 - 145 mmol/L    Potassium 3.8 3.5 - 5.0 mmol/L    Chloride 103 98 - 107 mmol/L    CO2 24 22 - 31 mmol/L    Anion Gap, Calculation 11 5 - 18 mmol/L    Glucose 95 70 - 125 mg/dL    Calcium 9.7 8.5 - 10.5 mg/dL    BUN 15 8 - 22 mg/dL    Creatinine 0.80 0.60 - 1.10 mg/dL    GFR MDRD Af Amer >60 >60 mL/min/1.73m2    GFR MDRD Non Af Amer >60 >60 mL/min/1.73m2    Narrative    Fasting Glucose reference range is 70-99 mg/dL per  American Diabetes Association (ADA) guidelines.     Electrolytes and kidney function labs are normal     Please call with questions or contact us using [employer].    Sincerely,        Electronically signed by PARISH Salgado

## 2021-07-03 ENCOUNTER — HEALTH MAINTENANCE LETTER (OUTPATIENT)
Age: 46
End: 2021-07-03

## 2021-10-14 ENCOUNTER — TRANSFERRED RECORDS (OUTPATIENT)
Dept: HEALTH INFORMATION MANAGEMENT | Facility: CLINIC | Age: 46
End: 2021-10-14
Payer: COMMERCIAL

## 2021-10-23 ENCOUNTER — HEALTH MAINTENANCE LETTER (OUTPATIENT)
Age: 46
End: 2021-10-23

## 2021-12-20 DIAGNOSIS — Z11.59 ENCOUNTER FOR SCREENING FOR OTHER VIRAL DISEASES: ICD-10-CM

## 2022-01-08 ENCOUNTER — E-VISIT (OUTPATIENT)
Dept: INTERNAL MEDICINE | Facility: CLINIC | Age: 47
End: 2022-01-08
Payer: COMMERCIAL

## 2022-01-08 DIAGNOSIS — Z20.822 SUSPECTED COVID-19 VIRUS INFECTION: Primary | ICD-10-CM

## 2022-01-08 PROCEDURE — 99421 OL DIG E/M SVC 5-10 MIN: CPT | Performed by: INTERNAL MEDICINE

## 2022-01-08 NOTE — PATIENT INSTRUCTIONS
Triny,      Based on your responses, you may have coronavirus (COVID-19). This illness can cause fever, cough and trouble breathing. Many people get a mild case and get better on their own. Some people can get very sick.    Your infection, if positive, may delay your surgery.    You have a appointment with Nelly Haque DNP on Tuesday, but hopefully we can get this test in before this, but volumes are high.    Will I be tested for COVID-19?  We would like to test you for COVID-19 virus. I have placed orders for this test.     To schedule: go to your [employer] home page and scroll down to the section that says  You have an appointment that needs to be scheduled  and click the large green button that says  Schedule Now  and follow the steps to find the next available openings.    If you are unable to complete these [employer] scheduling steps, please call [phone] to schedule your testing.     Return to work/school/ guidance:  Please let your workplace manager and staffing office know when your isolation ends.       If you receive a positive COVID-19 test result, follow the guidance of the those who are giving you the results. Usually the return to work is 10 days from symptom onset or positive test date, (or in some cases 20 days if you are immunocompromised). If your symptoms started after your positive test, the 10 days should start when your symptoms started.   o If you work at [facility], you must also be cleared by Employee Occupational Health and Safety to return to work.      If you receive a negative COVID-19 test result and did not have a high risk exposure to someone with a known positive COVID-19 test, you can return to work once you're free of fever for 24 hours without fever-reducing medication and your symptoms are improving or resolved.    If you receive a negative COVID-19 test and had a high-risk exposure to someone who has tested positive for COVID-19 then you can return to work 14 days  after your last contact with the positive individual. Follow quarantine guidance given by your doctor or public health officials.     Sign up for [employer]:  We know it's scary to hear that you might have COVID-19. We want to track your symptoms to make sure you're okay over the next 2 weeks. Please look for an email from [employer]--this is a free, online program that we'll use to keep in touch. To sign up, follow the link in the email you will receive. Learn more at [email or web address]    How can I take care of myself?  Over the counter medications may help with your symptoms like congestion, cough, chills, or fever.    There are not many effective prescription treatments for early COVID-19. Hydroxychloroquine, ivermectin, and azithromycin are not effective or recommended for COVID-19.    If your symptoms started in the last 10 days, you may be able to receive a treatment with monoclonal antibodies. This treatment can lower your risk of severe illness and going to the hospital. It is given through an IV or under your skin (subcutaneous) and must be given at an infusion center. You must be 12 or older, weight at least 88 pounds, and have a positive COVID-19 test.     If you would like to sign up to be considered to receive the monoclonal antibody medicine, please complete a participation form through the [facility] here: MNRAP ([email or web address]). You may also call the [facility] COVID-19 Public Hotline at 1-770.836.8784 (open Mon-Fri: 9am-7pm and Sat: 10am-6pm).     Not all people who are eligible will receive the medicine, since supply is limited. You will be contacted in the next 1 to 2 business days only if you are selected. If you do not receive a call, you have not been selected to receive the medicine. If you have any questions about this medication, please contact your primary care provider. For more information, see  [email or web address]/[email or web address]      Get lots of rest. Drink extra fluids (unless a doctor has told you not to)    Take Tylenol (acetaminophen) or ibuprofen for fever or pain. If you have liver or kidney problems, ask your family doctor if it's okay to take Tylenol o ibuprofen    Take over the counter medications for your symptoms, as directed by your doctor. You may also talk to your pharmacist.      If you have other health problems (like cancer, heart failure, an organ transplant or severe kidney disease): Call your specialty clinic if you don't feel better in the next 2 days.    Know when to call 911. Emergency warning signs include:  o Trouble breathing or shortness of breath  o Pain or pressure in the chest that doesn't go away  o Feeling confused like you haven't felt before, or not being able to wake up  o Bluish-colored lips or face    Where can I get more information?    [facility] - About COVID-19: [email or web address]     CDC - What to Do If You're Sick:     www.cdc.gov/coronavirus/2019-ncov/about/steps-when-sick.html    CDC - Ending Home Isolation:  https://www.cdc.gov/coronavirus/2019-ncov/your-health/quarantine-isolation.html    CDC - Caring for Someone:  www.cdc.gov/coronavirus/2019-ncov/if-you-are-sick/care-for-someone.html    [facility] clinical trials (COVID-19 research studies): clinicalaffairs.[facility]-clinical-trials    Below are the COVID-19 hotlines at the [facility] of Health ([facility]). Interpreters are available.  o For health questions: Call 827-731-7265 or 1-724.389.6752 (7 a.m. to 7 p.m.)  o For questions about schools and childcare: Call 891-376-0646 or 1-540.697.9194 (7 a.m. to 7 p.m.)

## 2022-01-11 ENCOUNTER — LAB (OUTPATIENT)
Dept: FAMILY MEDICINE | Facility: CLINIC | Age: 47
End: 2022-01-11
Attending: STUDENT IN AN ORGANIZED HEALTH CARE EDUCATION/TRAINING PROGRAM
Payer: COMMERCIAL

## 2022-01-12 ENCOUNTER — TELEPHONE (OUTPATIENT)
Dept: FAMILY MEDICINE | Facility: CLINIC | Age: 47
End: 2022-01-12

## 2022-01-12 NOTE — TELEPHONE ENCOUNTER
"Coronavirus (COVID-19) Notification    Caller Name (Patient, parent, daughter/son, grandparent, etc)  Patient    Reason for call  Notify of Positive Coronavirus (COVID-19) lab results, assess symptoms,  review  [facility] recommendations    Lab Result    Lab test:  2019-nCoV rRt-PCR or SARS-CoV-2 PCR    Oropharyngeal AND/OR nasopharyngeal swabs is POSITIVE for 2019-nCoV RNA/SARS-COV-2 PCR (COVID-19 virus)    RN Recommendations/Instructions per [facility] Coronavirus COVID-19 recommendations    Brief introduction script  Introduce self then review script:  \"I am calling on behalf of [employer].  We were notified that your Coronavirus test (COVID-19) for was POSITIVE for the virus.  I have some information to relay to you but first I wanted to mention that the [place] Dept of Health will be contacting you shortly [it's possible MD already called Patient] to talk to you more about how you are feeling and other people you have had contact with who might now also have the virus.  Also,  [facility] is Partnering with the [facility] for Covid-19 research, you may be contacted directly by research staff.\"    Assessment (Inquire about Patient's current symptoms)   Assessment   Current Symptoms at time of phone call: (if no symptoms, document No symptoms] None   Symptoms onset (if applicable) NA     If at time of call, Patients symptoms hare worsened, the Patient should contact 911 or have someone drive them to Emergency Dept promptly:      If Patient calling 911, inform 911 personal that you have tested positive for the Coronavirus (COVID-19).  Place mask on and await 911 to arrive.    If Emergency Dept, If possible, please have another adult drive you to the Emergency Dept but you need to wear mask when in contact with other people.      Monoclonal Antibody Administration    You may be eligible to receive a new treatment with a monoclonal antibody for preventing hospitalization in patients at high " risk for complications from COVID-19.   This medication is still experimental and available on a limited basis; it is given through an IV and must be given at an infusion center. Please note that not all people who are eligible will receive the medication since it is in limited supply.     Are you interested in being considered for this medication?  No.   Does the patient fit the criteria: No    Review information with Patient    Your result was positive. This means you have COVID-19 (coronavirus).  We have sent you a letter that reviews the information that I'll be reviewing with you now.    How can I protect others?    If you have symptoms: stay home and away from others (self-isolate) until:    You've had no fever--and no medicine that reduces fever--for 1 full day (24 hours). And       Your other symptoms have gotten better. For example, your cough or breathing has improved. And     At least 10 days have passed since your symptoms started. (If you've been told by a doctor that you have a weak immune system, wait 20 days.)     If you don't have symptoms: Stay home and away from others (self-isolate) until at least 10 days have passed since your first positive COVID-19 test. (Date test collected)    During this time:    Stay in your own room, including for meals. Use your own bathroom if you can.    Stay away from others in your home. No hugging, kissing or shaking hands. No visitors.     Don't go to work, school or anywhere else.     Clean  high touch  surfaces often (doorknobs, counters, handles, etc.). Use a household cleaning spray or wipes. You'll find a full list on the EPA website at www.epa.gov/pesticide-registration/list-n-disinfectants-use-against-sars-cov-2.     Cover your mouth and nose with a mask, tissue or other face covering to avoid spreading germs.    Wash your hands and face often with soap and water.    Make a list of people you have been in close contact with recently, even if either of you  wore a face covering.   - Start your list from 2 days before you became ill or had a positive test.  - Include anyone that was within 6 feet of you for a cumulative total of 15 minutes or more in 24 hours. (Example: if you sat next to Christiano for 5 minutes in the morning and 10 minutes in the afternoon, then you were in close contact for 15 minutes total that day. Christiano would be added to your list.)    A public health worker will call or text you. It is important that you answer. They will ask you questions about possible exposures to COVID-19, such as people you have been in direct contact with and places you have visited.    Tell the people on your list that you have COVID-19; they should stay away from others for 14 days starting from the last time they were in contact with you (unless you are told something different from a public health worker).     Caregivers in these groups are at risk for severe illness due to COVID-19:  o People 65 years and older  o People who live in a nursing home or long-term care facility  o People with chronic disease (lung, heart, cancer, diabetes, kidney, liver, immunologic)  o People who have a weakened immune system, including those who:  - Are in cancer treatment  - Take medicine that weakens the immune system, such as corticosteroids  - Had a bone marrow or organ transplant  - Have an immune deficiency  - Have poorly controlled HIV or AIDS  - Are obese (body mass index of 40 or higher)  - Smoke regularly    Caregivers should wear gloves while washing dishes, handling laundry and cleaning bedrooms and bathrooms.    Wash and dry laundry with special caution. Don't shake dirty laundry, and use the warmest water setting you can.    If you have a weakened immune system, ask your doctor about other actions you should take.    For more tips, go to www.cdc.gov/coronavirus/2019-ncov/downloads/10Things.pdf.    You should not go back to work until you meet the guidelines above for ending your  home isolation. You don't need to be retested for COVID-19 before going back to work--studies show that you won't spread the virus if it's been at least 10 days since your symptoms started (or 20 days, if you have a weak immune system).    Employers: This document serves as formal notice of your employee's medical guidelines for going back to work. They must meet the above guidelines before going back to work in person.    How can I take care of myself?    1. Get lots of rest. Drink extra fluids (unless a doctor has told you not to).    2. Take Tylenol (acetaminophen) for fever or pain. If you have liver or kidney problems, ask your family doctor if it's okay to take Tylenol.     Take either:     650 mg (two 325 mg pills) every 4 to 6 hours, or     1,000 mg (two 500 mg pills) every 8 hours as needed.     Note: Don't take more than 3,000 mg in one day. Acetaminophen is found in many medicines (both prescribed and over-the-counter medicines). Read all labels to be sure you don't take too much.    For children, check the Tylenol bottle for the right dose (based on their age or weight).    3. If you have other health problems (like cancer, heart failure, an organ transplant or severe kidney disease): Call your specialty clinic if you don't feel better in the next 2 days.    4. Know when to call 911: Emergency warning signs include:    Trouble breathing or shortness of breath    Pain or pressure in the chest that doesn't go away    Feeling confused like you haven't felt before, or not being able to wake up    Bluish-colored lips or face    5. Sign up for [employer]. We know it's scary to hear that you have COVID-19. We want to track your symptoms to make sure you're okay over the next 2 weeks. Please look for an email from [employer]--this is a free, online program that we'll use to keep in touch. To sign up, follow the link in the email. Learn more at [email or web address].    Where can I get more  information?    [facility]: [email or web address]    Coronavirus Basics: www.health.[facility].[place]/diseases/coronavirus/basics.html    What to Do If You're Sick: www.cdc.gov/coronavirus/2019-ncov/about/steps-when-sick.html    Ending Home Isolation: www.cdc.gov/coronavirus/2019-ncov/hcp/disposition-in-home-patients.html     Caring for Someone with COVID-19: www.cdc.gov/coronavirus/2019-ncov/if-you-are-sick/care-for-someone.html     [facility] clinical trials (COVID-19 research studies): [email or web address].[email or web address]-clinical-trials     A Positive COVID-19 letter will be sent via [employer] or the mail. (Exception, no letters sent to Presurgerical/Preprocedure Patients)  Pre-procedure testing, patient states was positive on 1/5/22, had symptoms back on 12/31/21.  Has already spoken with the office and procedure has been rescheduled.  Beatris Lopez LPN

## 2022-02-28 ENCOUNTER — OFFICE VISIT (OUTPATIENT)
Dept: INTERNAL MEDICINE | Facility: CLINIC | Age: 47
End: 2022-02-28
Payer: COMMERCIAL

## 2022-02-28 VITALS
WEIGHT: 145.9 LBS | HEIGHT: 65 IN | HEART RATE: 63 BPM | SYSTOLIC BLOOD PRESSURE: 104 MMHG | TEMPERATURE: 98.3 F | DIASTOLIC BLOOD PRESSURE: 60 MMHG | OXYGEN SATURATION: 100 % | BODY MASS INDEX: 24.31 KG/M2

## 2022-02-28 DIAGNOSIS — N93.9 ABNORMAL UTERINE BLEEDING (AUB): ICD-10-CM

## 2022-02-28 DIAGNOSIS — L21.9 SEBORRHEIC DERMATITIS OF SCALP: ICD-10-CM

## 2022-02-28 DIAGNOSIS — Z01.818 PRE-OPERATIVE GENERAL PHYSICAL EXAMINATION: Primary | ICD-10-CM

## 2022-02-28 DIAGNOSIS — F34.1 DYSTHYMIA: ICD-10-CM

## 2022-02-28 DIAGNOSIS — F41.1 GAD (GENERALIZED ANXIETY DISORDER): ICD-10-CM

## 2022-02-28 DIAGNOSIS — R60.9 IDIOPATHIC EDEMA: ICD-10-CM

## 2022-02-28 DIAGNOSIS — Z12.11 COLON CANCER SCREENING: ICD-10-CM

## 2022-02-28 DIAGNOSIS — Z23 COVID-19 VACCINE ADMINISTERED: ICD-10-CM

## 2022-02-28 DIAGNOSIS — Z11.59 NEED FOR HEPATITIS C SCREENING TEST: ICD-10-CM

## 2022-02-28 DIAGNOSIS — Z12.31 VISIT FOR SCREENING MAMMOGRAM: ICD-10-CM

## 2022-02-28 LAB
ANION GAP SERPL CALCULATED.3IONS-SCNC: 10 MMOL/L (ref 5–18)
BUN SERPL-MCNC: 13 MG/DL (ref 8–22)
CALCIUM SERPL-MCNC: 9.2 MG/DL (ref 8.5–10.5)
CHLORIDE BLD-SCNC: 103 MMOL/L (ref 98–107)
CO2 SERPL-SCNC: 24 MMOL/L (ref 22–31)
CREAT SERPL-MCNC: 0.75 MG/DL (ref 0.6–1.1)
ERYTHROCYTE [DISTWIDTH] IN BLOOD BY AUTOMATED COUNT: 13.7 % (ref 10–15)
GFR SERPL CREATININE-BSD FRML MDRD: >90 ML/MIN/1.73M2
GLUCOSE BLD-MCNC: 90 MG/DL (ref 70–125)
HCT VFR BLD AUTO: 36.4 % (ref 35–47)
HGB BLD-MCNC: 12 G/DL (ref 11.7–15.7)
MCH RBC QN AUTO: 26.8 PG (ref 26.5–33)
MCHC RBC AUTO-ENTMCNC: 33 G/DL (ref 31.5–36.5)
MCV RBC AUTO: 81 FL (ref 78–100)
PLATELET # BLD AUTO: 304 10E3/UL (ref 150–450)
POTASSIUM BLD-SCNC: 3.7 MMOL/L (ref 3.5–5)
RBC # BLD AUTO: 4.47 10E6/UL (ref 3.8–5.2)
SODIUM SERPL-SCNC: 137 MMOL/L (ref 136–145)
WBC # BLD AUTO: 8.7 10E3/UL (ref 4–11)

## 2022-02-28 PROCEDURE — 0064A COVID-19,PF,MODERNA (18+ YRS BOOSTER .25ML): CPT | Performed by: NURSE PRACTITIONER

## 2022-02-28 PROCEDURE — 85027 COMPLETE CBC AUTOMATED: CPT | Performed by: NURSE PRACTITIONER

## 2022-02-28 PROCEDURE — 80048 BASIC METABOLIC PNL TOTAL CA: CPT | Performed by: NURSE PRACTITIONER

## 2022-02-28 PROCEDURE — 91306 COVID-19,PF,MODERNA (18+ YRS BOOSTER .25ML): CPT | Performed by: NURSE PRACTITIONER

## 2022-02-28 PROCEDURE — 99214 OFFICE O/P EST MOD 30 MIN: CPT | Mod: 25 | Performed by: NURSE PRACTITIONER

## 2022-02-28 PROCEDURE — 86803 HEPATITIS C AB TEST: CPT | Performed by: NURSE PRACTITIONER

## 2022-02-28 PROCEDURE — 90471 IMMUNIZATION ADMIN: CPT | Performed by: NURSE PRACTITIONER

## 2022-02-28 PROCEDURE — 90686 IIV4 VACC NO PRSV 0.5 ML IM: CPT | Performed by: NURSE PRACTITIONER

## 2022-02-28 PROCEDURE — 36415 COLL VENOUS BLD VENIPUNCTURE: CPT | Performed by: NURSE PRACTITIONER

## 2022-02-28 ASSESSMENT — ANXIETY QUESTIONNAIRES
1. FEELING NERVOUS, ANXIOUS, OR ON EDGE: SEVERAL DAYS
3. WORRYING TOO MUCH ABOUT DIFFERENT THINGS: SEVERAL DAYS
2. NOT BEING ABLE TO STOP OR CONTROL WORRYING: SEVERAL DAYS
GAD7 TOTAL SCORE: 5
7. FEELING AFRAID AS IF SOMETHING AWFUL MIGHT HAPPEN: NOT AT ALL
5. BEING SO RESTLESS THAT IT IS HARD TO SIT STILL: NOT AT ALL
4. TROUBLE RELAXING: NOT AT ALL
7. FEELING AFRAID AS IF SOMETHING AWFUL MIGHT HAPPEN: NOT AT ALL
6. BECOMING EASILY ANNOYED OR IRRITABLE: MORE THAN HALF THE DAYS
GAD7 TOTAL SCORE: 5
GAD7 TOTAL SCORE: 5

## 2022-02-28 ASSESSMENT — PATIENT HEALTH QUESTIONNAIRE - PHQ9
10. IF YOU CHECKED OFF ANY PROBLEMS, HOW DIFFICULT HAVE THESE PROBLEMS MADE IT FOR YOU TO DO YOUR WORK, TAKE CARE OF THINGS AT HOME, OR GET ALONG WITH OTHER PEOPLE: NOT DIFFICULT AT ALL
SUM OF ALL RESPONSES TO PHQ QUESTIONS 1-9: 3
SUM OF ALL RESPONSES TO PHQ QUESTIONS 1-9: 3

## 2022-02-28 NOTE — H&P (VIEW-ONLY)
[address] [address]  Phone: [phone]  Fax: [phone]  Primary Provider: Nelly Haque    6}  PREOPERATIVE EVALUATION:  Today's date: 2/28/2022    Triny Corcoran is a 46 year old female who presents for a preoperative evaluation.    Surgical Information:  Surgery/Procedure: TOTAL VAGINAL HYSTERECTOMY BILATERAL SALINGECTOMY  Surgery Location: [facility] Main OR  Surgeon: Dr. Lovett  Surgery Date: 03/17/2022  Time of Surgery: 7:30 AM  Where patient plans to recover: At home with family  Fax number for surgical facility: Note does not need to be faxed, will be available electronically in Epic.    Type of Anesthesia Anticipated: General    Assessment & Plan     The proposed surgical procedure is considered INTERMEDIATE risk.    Problem List Items Addressed This Visit        Musculoskeletal and Integumentary    Seborrheic dermatitis of scalp    Relevant Medications    ketoconazole (NIZORAL) 2 % external shampoo       Behavioral    EVELYN (generalized anxiety disorder)     She is been struggling recently with increased anxiety symptoms.  We discussed a small increase in sertraline to 75 mg daily.  She is advised to follow-up in about 6 weeks, can be with a virtual visit.         Relevant Medications    sertraline (ZOLOFT) 50 MG tablet    Dysthymia     Remission          Relevant Medications    sertraline (ZOLOFT) 50 MG tablet       Other    Idiopathic edema     Well-controlled with triamterene-hydrochlorothiazide  Metabolic panel today           Other Visit Diagnoses     Pre-operative general physical examination    -  Primary    Relevant Orders    Basic metabolic panel (Completed)    CBC with platelets (Completed)    Abnormal uterine bleeding (AUB)        Visit for screening mammogram        Relevant Orders    *MA Screening Digital Bilateral    Colon cancer screening        Relevant Orders    Adult Gastro Ref - Procedure Only    COVID-19 vaccine  administered        Relevant Orders    COVID-19,PF,MODERNA (18+ YRS BOOSTER .25ML) (Completed)    Need for hepatitis C screening test        Relevant Orders    Hepatitis C Screen Reflex to HCV RNA Quant and Genotype (Completed)            Medication Instructions:   - Diuretics: HOLD on the day of surgery.    RECOMMENDATION:  APPROVAL GIVEN to proceed with proposed procedure, without further diagnostic evaluation.      Subjective     HPI related to upcoming procedure: Triny Corcoran is a 45 y/o female who presents to the office for a preoperative physical.  She has a history of heavy menstrual bleeding and will undergo hysterectomy.    She had COVID in January. She still has congestion but no shortness of breath or coughing. No fevers.     She has a history of AUB with menorrhagia.     EVELYN was reviewed. Sertraline doesn't seem to be working for her, it is typically most bothersome when she is at work.     EVELYN-7 SCORE 2/20/2020 2/28/2022   Total Score - 5 (mild anxiety)   Total Score 8 5       PHQ 2/20/2020 2/28/2022   PHQ-9 Total Score 9 3   Q9: Thoughts of better off dead/self-harm past 2 weeks Not at all Not at all           Preop Questions 2/28/2022   1. Have you ever had a heart attack or stroke? No   2. Have you ever had surgery on your heart or blood vessels, such as a stent placement, a coronary artery bypass, or surgery on an artery in your head, neck, heart, or legs? No   3. Do you have chest pain with activity? No   4. Do you have a history of  heart failure? No   5. Do you currently have a cold, bronchitis or symptoms of other infection? No   6. Do you have a cough, shortness of breath, or wheezing? No   7. Do you or anyone in your family have previous history of blood clots? No   8. Do you or does anyone in your family have a serious bleeding problem such as prolonged bleeding following surgeries or cuts? No   9. Have you ever had problems with anemia or been told to take iron pills? No   10. Have you  had any abnormal blood loss such as black, tarry or bloody stools, or abnormal vaginal bleeding? No   11. Have you ever had a blood transfusion? No   12. Are you willing to have a blood transfusion if it is medically needed before, during, or after your surgery? Yes   13. Have you or any of your relatives ever had problems with anesthesia? No   14. Do you have sleep apnea, excessive snoring or daytime drowsiness? No   15. Do you have any artifical heart valves or other implanted medical devices like a pacemaker, defibrillator, or continuous glucose monitor? No   16. Do you have artificial joints? No   17. Are you allergic to latex? No   18. Is there any chance that you may be pregnant? No     Preoperative Review of :   reviewed - no record of controlled substances prescribed.-- none in the past 1 year       Review of Systems  CONSTITUTIONAL: NEGATIVE for fever, chills, change in weight  INTEGUMENTARY/SKIN: NEGATIVE for worrisome rashes, moles or lesions  EYES: NEGATIVE for vision changes or irritation  ENT/MOUTH: NEGATIVE for ear, mouth and throat problems  RESP: NEGATIVE for significant cough or SOB  CV: NEGATIVE for chest pain, palpitations or peripheral edema  GI: NEGATIVE for nausea, abdominal pain, heartburn, or change in bowel habits  : NEGATIVE for frequency, dysuria, or hematuria  MUSCULOSKELETAL: NEGATIVE for significant arthralgias or myalgia  NEURO: NEGATIVE for weakness, dizziness or paresthesias  ENDOCRINE: NEGATIVE for temperature intolerance, skin/hair changes  HEME: NEGATIVE for bleeding problems  PSYCHIATRIC: NEGATIVE for changes in affect. +anxiety/irritability     Patient Active Problem List    Diagnosis Date Noted     EVELYN (generalized anxiety disorder) 09/12/2016     Priority: Medium     Dysthymia 09/12/2016     Priority: Medium     Seborrheic dermatitis of scalp 09/12/2016     Priority: Medium     Routine general medical examination at a health care facility 04/15/2011     Priority:  "Medium     Overview:   COMPLETE PHYSICAL EXAM DONEdec 2012; DUE FOR COMPLETE PHYSICAL EXAM IN 1   YEAR   Chol:  Pending  Colon:  DEXA:  Issues to address at next visit:   Angélica Mcpherson M.D.  4/15/2011  10:54 AM          Idiopathic edema 03/17/2016     Priority: Low      Past Medical History:   Diagnosis Date     Anxiety      H/O: depression      Idiopathic edema      S/P colonoscopy 10/17/2011     Past Surgical History:   Procedure Laterality Date     TONSILLECTOMY       Current Outpatient Medications   Medication Sig Dispense Refill     ketoconazole (NIZORAL) 2 % external shampoo Apply topically daily as needed for itching or irritation 120 mL 3     sertraline (ZOLOFT) 50 MG tablet Take 1.5 tablets (75 mg) by mouth daily 45 tablet 1     triamterene-HCTZ (MAXZIDE) 75-50 MG tablet Take 1 tablet by mouth daily Due for appointment 90 tablet 0     LORazepam (ATIVAN) 0.5 MG tablet [LORAZEPAM (ATIVAN) 0.5 MG TABLET] Take 1 tablet (0.5 mg total) by mouth every 8 (eight) hours as needed for anxiety. (Patient not taking: Reported on 2/28/2022) 15 tablet 0       No Known Allergies     Social History     Tobacco Use     Smoking status: Never Smoker     Smokeless tobacco: Never Used   Substance Use Topics     Alcohol use: Not on file     Comment: Alcoholic Drinks/day: rare, less than 1 drink per week      Family History   Problem Relation Age of Onset     Melanoma Mother      Diabetes Father      No Known Problems Brother      No Known Problems Son      No Known Problems Daughter      Coronary Artery Disease Maternal Uncle 50.00     Cerebrovascular Disease Maternal Uncle 60.00     History   Drug Use No            Objective     /60 (BP Location: Right arm, Patient Position: Sitting, Cuff Size: Adult Regular)   Pulse 63   Temp 98.3  F (36.8  C) (Oral)   Ht 1.64 m (5' 4.57\")   Wt 66.2 kg (145 lb 14.4 oz)   SpO2 100%   BMI 24.61 kg/m      Physical Exam    GENERAL APPEARANCE: healthy, alert and no distress     " EYES: EOMI     HENT: ear canals and TM's normal and nose and mouth without ulcers or lesions     NECK: no adenopathy, no asymmetry, masses, or scars and thyroid normal to palpation     RESP: lungs clear to auscultation - no rales, rhonchi or wheezes     CV: regular rates and rhythm, normal S1 S2, no S3 or S4 and no murmur, click or rub     ABDOMEN:  soft, nontender, no HSM or masses and bowel sounds normal     MS: extremities normal- no gross deformities noted     SKIN: no suspicious lesions or rashes     NEURO: Normal strength and tone, sensory exam grossly normal, mentation intact and speech normal     PSYCH: mentation appears normal. and affect normal/bright. Mood is anxious      LYMPHATICS: No cervical adenopathy    Recent Labs   Lab Test 03/23/21  1040      POTASSIUM 3.5   CR 0.79        Diagnostics:  Recent Results (from the past 168 hour(s))   Hepatitis C Screen Reflex to HCV RNA Quant and Genotype    Collection Time: 02/28/22  1:34 PM   Result Value Ref Range    Hepatitis C Antibody Nonreactive Nonreactive   Basic metabolic panel    Collection Time: 02/28/22  1:34 PM   Result Value Ref Range    Sodium 137 136 - 145 mmol/L    Potassium 3.7 3.5 - 5.0 mmol/L    Chloride 103 98 - 107 mmol/L    Carbon Dioxide (CO2) 24 22 - 31 mmol/L    Anion Gap 10 5 - 18 mmol/L    Urea Nitrogen 13 8 - 22 mg/dL    Creatinine 0.75 0.60 - 1.10 mg/dL    Calcium 9.2 8.5 - 10.5 mg/dL    Glucose 90 70 - 125 mg/dL    GFR Estimate >90 >60 mL/min/1.73m2   CBC with platelets    Collection Time: 02/28/22  1:34 PM   Result Value Ref Range    WBC Count 8.7 4.0 - 11.0 10e3/uL    RBC Count 4.47 3.80 - 5.20 10e6/uL    Hemoglobin 12.0 11.7 - 15.7 g/dL    Hematocrit 36.4 35.0 - 47.0 %    MCV 81 78 - 100 fL    MCH 26.8 26.5 - 33.0 pg    MCHC 33.0 31.5 - 36.5 g/dL    RDW 13.7 10.0 - 15.0 %    Platelet Count 304 150 - 450 10e3/uL          Revised Cardiac Risk Index (RCRI):  The patient has the following serious cardiovascular risks for  perioperative complications:   - No serious cardiac risks = 0 points     RCRI Interpretation: 0 points: Class I (very low risk - 0.4% complication rate)           Signed Electronically by: Nlely Haque NP  Copy of this evaluation report is provided to requesting physician.

## 2022-02-28 NOTE — PROGRESS NOTES
[address] [address]  Phone: [phone]  Fax: [phone]  Primary Provider: Nelly Haque    6}  PREOPERATIVE EVALUATION:  Today's date: 2/28/2022    Triny Corcoran is a 46 year old female who presents for a preoperative evaluation.    Surgical Information:  Surgery/Procedure: TOTAL VAGINAL HYSTERECTOMY BILATERAL SALINGECTOMY  Surgery Location: [facility] Main OR  Surgeon: Dr. Lovett  Surgery Date: 03/17/2022  Time of Surgery: 7:30 AM  Where patient plans to recover: At home with family  Fax number for surgical facility: Note does not need to be faxed, will be available electronically in Epic.    Type of Anesthesia Anticipated: General    Assessment & Plan     The proposed surgical procedure is considered INTERMEDIATE risk.    Problem List Items Addressed This Visit        Musculoskeletal and Integumentary    Seborrheic dermatitis of scalp    Relevant Medications    ketoconazole (NIZORAL) 2 % external shampoo       Behavioral    EVELYN (generalized anxiety disorder)     She is been struggling recently with increased anxiety symptoms.  We discussed a small increase in sertraline to 75 mg daily.  She is advised to follow-up in about 6 weeks, can be with a virtual visit.         Relevant Medications    sertraline (ZOLOFT) 50 MG tablet    Dysthymia     Remission          Relevant Medications    sertraline (ZOLOFT) 50 MG tablet       Other    Idiopathic edema     Well-controlled with triamterene-hydrochlorothiazide  Metabolic panel today           Other Visit Diagnoses     Pre-operative general physical examination    -  Primary    Relevant Orders    Basic metabolic panel (Completed)    CBC with platelets (Completed)    Abnormal uterine bleeding (AUB)        Visit for screening mammogram        Relevant Orders    *MA Screening Digital Bilateral    Colon cancer screening        Relevant Orders    Adult Gastro Ref - Procedure Only    COVID-19 vaccine  administered        Relevant Orders    COVID-19,PF,MODERNA (18+ YRS BOOSTER .25ML) (Completed)    Need for hepatitis C screening test        Relevant Orders    Hepatitis C Screen Reflex to HCV RNA Quant and Genotype (Completed)            Medication Instructions:   - Diuretics: HOLD on the day of surgery.    RECOMMENDATION:  APPROVAL GIVEN to proceed with proposed procedure, without further diagnostic evaluation.      Subjective     HPI related to upcoming procedure: Triny Corcoran is a 47 y/o female who presents to the office for a preoperative physical.  She has a history of heavy menstrual bleeding and will undergo hysterectomy.    She had COVID in January. She still has congestion but no shortness of breath or coughing. No fevers.     She has a history of AUB with menorrhagia.     EVELYN was reviewed. Sertraline doesn't seem to be working for her, it is typically most bothersome when she is at work.     EVELYN-7 SCORE 2/20/2020 2/28/2022   Total Score - 5 (mild anxiety)   Total Score 8 5       PHQ 2/20/2020 2/28/2022   PHQ-9 Total Score 9 3   Q9: Thoughts of better off dead/self-harm past 2 weeks Not at all Not at all           Preop Questions 2/28/2022   1. Have you ever had a heart attack or stroke? No   2. Have you ever had surgery on your heart or blood vessels, such as a stent placement, a coronary artery bypass, or surgery on an artery in your head, neck, heart, or legs? No   3. Do you have chest pain with activity? No   4. Do you have a history of  heart failure? No   5. Do you currently have a cold, bronchitis or symptoms of other infection? No   6. Do you have a cough, shortness of breath, or wheezing? No   7. Do you or anyone in your family have previous history of blood clots? No   8. Do you or does anyone in your family have a serious bleeding problem such as prolonged bleeding following surgeries or cuts? No   9. Have you ever had problems with anemia or been told to take iron pills? No   10. Have you  had any abnormal blood loss such as black, tarry or bloody stools, or abnormal vaginal bleeding? No   11. Have you ever had a blood transfusion? No   12. Are you willing to have a blood transfusion if it is medically needed before, during, or after your surgery? Yes   13. Have you or any of your relatives ever had problems with anesthesia? No   14. Do you have sleep apnea, excessive snoring or daytime drowsiness? No   15. Do you have any artifical heart valves or other implanted medical devices like a pacemaker, defibrillator, or continuous glucose monitor? No   16. Do you have artificial joints? No   17. Are you allergic to latex? No   18. Is there any chance that you may be pregnant? No     Preoperative Review of :   reviewed - no record of controlled substances prescribed.-- none in the past 1 year       Review of Systems  CONSTITUTIONAL: NEGATIVE for fever, chills, change in weight  INTEGUMENTARY/SKIN: NEGATIVE for worrisome rashes, moles or lesions  EYES: NEGATIVE for vision changes or irritation  ENT/MOUTH: NEGATIVE for ear, mouth and throat problems  RESP: NEGATIVE for significant cough or SOB  CV: NEGATIVE for chest pain, palpitations or peripheral edema  GI: NEGATIVE for nausea, abdominal pain, heartburn, or change in bowel habits  : NEGATIVE for frequency, dysuria, or hematuria  MUSCULOSKELETAL: NEGATIVE for significant arthralgias or myalgia  NEURO: NEGATIVE for weakness, dizziness or paresthesias  ENDOCRINE: NEGATIVE for temperature intolerance, skin/hair changes  HEME: NEGATIVE for bleeding problems  PSYCHIATRIC: NEGATIVE for changes in affect. +anxiety/irritability     Patient Active Problem List    Diagnosis Date Noted     EVELYN (generalized anxiety disorder) 09/12/2016     Priority: Medium     Dysthymia 09/12/2016     Priority: Medium     Seborrheic dermatitis of scalp 09/12/2016     Priority: Medium     Routine general medical examination at a health care facility 04/15/2011     Priority:  "Medium     Overview:   COMPLETE PHYSICAL EXAM DONEdec 2012; DUE FOR COMPLETE PHYSICAL EXAM IN 1   YEAR   Chol:  Pending  Colon:  DEXA:  Issues to address at next visit:   Angélica Mcpherson M.D.  4/15/2011  10:54 AM          Idiopathic edema 03/17/2016     Priority: Low      Past Medical History:   Diagnosis Date     Anxiety      H/O: depression      Idiopathic edema      S/P colonoscopy 10/17/2011     Past Surgical History:   Procedure Laterality Date     TONSILLECTOMY       Current Outpatient Medications   Medication Sig Dispense Refill     ketoconazole (NIZORAL) 2 % external shampoo Apply topically daily as needed for itching or irritation 120 mL 3     sertraline (ZOLOFT) 50 MG tablet Take 1.5 tablets (75 mg) by mouth daily 45 tablet 1     triamterene-HCTZ (MAXZIDE) 75-50 MG tablet Take 1 tablet by mouth daily Due for appointment 90 tablet 0     LORazepam (ATIVAN) 0.5 MG tablet [LORAZEPAM (ATIVAN) 0.5 MG TABLET] Take 1 tablet (0.5 mg total) by mouth every 8 (eight) hours as needed for anxiety. (Patient not taking: Reported on 2/28/2022) 15 tablet 0       No Known Allergies     Social History     Tobacco Use     Smoking status: Never Smoker     Smokeless tobacco: Never Used   Substance Use Topics     Alcohol use: Not on file     Comment: Alcoholic Drinks/day: rare, less than 1 drink per week      Family History   Problem Relation Age of Onset     Melanoma Mother      Diabetes Father      No Known Problems Brother      No Known Problems Son      No Known Problems Daughter      Coronary Artery Disease Maternal Uncle 50.00     Cerebrovascular Disease Maternal Uncle 60.00     History   Drug Use No            Objective     /60 (BP Location: Right arm, Patient Position: Sitting, Cuff Size: Adult Regular)   Pulse 63   Temp 98.3  F (36.8  C) (Oral)   Ht 1.64 m (5' 4.57\")   Wt 66.2 kg (145 lb 14.4 oz)   SpO2 100%   BMI 24.61 kg/m      Physical Exam    GENERAL APPEARANCE: healthy, alert and no distress     " EYES: EOMI     HENT: ear canals and TM's normal and nose and mouth without ulcers or lesions     NECK: no adenopathy, no asymmetry, masses, or scars and thyroid normal to palpation     RESP: lungs clear to auscultation - no rales, rhonchi or wheezes     CV: regular rates and rhythm, normal S1 S2, no S3 or S4 and no murmur, click or rub     ABDOMEN:  soft, nontender, no HSM or masses and bowel sounds normal     MS: extremities normal- no gross deformities noted     SKIN: no suspicious lesions or rashes     NEURO: Normal strength and tone, sensory exam grossly normal, mentation intact and speech normal     PSYCH: mentation appears normal. and affect normal/bright. Mood is anxious      LYMPHATICS: No cervical adenopathy    Recent Labs   Lab Test 03/23/21  1040      POTASSIUM 3.5   CR 0.79        Diagnostics:  Recent Results (from the past 168 hour(s))   Hepatitis C Screen Reflex to HCV RNA Quant and Genotype    Collection Time: 02/28/22  1:34 PM   Result Value Ref Range    Hepatitis C Antibody Nonreactive Nonreactive   Basic metabolic panel    Collection Time: 02/28/22  1:34 PM   Result Value Ref Range    Sodium 137 136 - 145 mmol/L    Potassium 3.7 3.5 - 5.0 mmol/L    Chloride 103 98 - 107 mmol/L    Carbon Dioxide (CO2) 24 22 - 31 mmol/L    Anion Gap 10 5 - 18 mmol/L    Urea Nitrogen 13 8 - 22 mg/dL    Creatinine 0.75 0.60 - 1.10 mg/dL    Calcium 9.2 8.5 - 10.5 mg/dL    Glucose 90 70 - 125 mg/dL    GFR Estimate >90 >60 mL/min/1.73m2   CBC with platelets    Collection Time: 02/28/22  1:34 PM   Result Value Ref Range    WBC Count 8.7 4.0 - 11.0 10e3/uL    RBC Count 4.47 3.80 - 5.20 10e6/uL    Hemoglobin 12.0 11.7 - 15.7 g/dL    Hematocrit 36.4 35.0 - 47.0 %    MCV 81 78 - 100 fL    MCH 26.8 26.5 - 33.0 pg    MCHC 33.0 31.5 - 36.5 g/dL    RDW 13.7 10.0 - 15.0 %    Platelet Count 304 150 - 450 10e3/uL          Revised Cardiac Risk Index (RCRI):  The patient has the following serious cardiovascular risks for  perioperative complications:   - No serious cardiac risks = 0 points     RCRI Interpretation: 0 points: Class I (very low risk - 0.4% complication rate)           Signed Electronically by: Nelly Haque NP  Copy of this evaluation report is provided to requesting physician.

## 2022-03-01 LAB — HCV AB SERPL QL IA: NONREACTIVE

## 2022-03-01 ASSESSMENT — PATIENT HEALTH QUESTIONNAIRE - PHQ9: SUM OF ALL RESPONSES TO PHQ QUESTIONS 1-9: 3

## 2022-03-01 ASSESSMENT — ANXIETY QUESTIONNAIRES: GAD7 TOTAL SCORE: 5

## 2022-03-02 RX ORDER — KETOCONAZOLE 20 MG/ML
SHAMPOO TOPICAL DAILY PRN
Qty: 120 ML | Refills: 3 | Status: SHIPPED | OUTPATIENT
Start: 2022-03-02 | End: 2022-11-29

## 2022-03-02 NOTE — ASSESSMENT & PLAN NOTE
She is been struggling recently with increased anxiety symptoms.  We discussed a small increase in sertraline to 75 mg daily.  She is advised to follow-up in about 6 weeks, can be with a virtual visit.

## 2022-03-16 ENCOUNTER — ANESTHESIA EVENT (OUTPATIENT)
Dept: SURGERY | Facility: HOSPITAL | Age: 47
End: 2022-03-16
Payer: COMMERCIAL

## 2022-03-17 ENCOUNTER — HOSPITAL ENCOUNTER (OUTPATIENT)
Facility: HOSPITAL | Age: 47
Discharge: HOME | End: 2022-03-17
Attending: STUDENT IN AN ORGANIZED HEALTH CARE EDUCATION/TRAINING PROGRAM | Admitting: STUDENT IN AN ORGANIZED HEALTH CARE EDUCATION/TRAINING PROGRAM
Payer: COMMERCIAL

## 2022-03-17 ENCOUNTER — ANESTHESIA (OUTPATIENT)
Dept: SURGERY | Facility: HOSPITAL | Age: 47
End: 2022-03-17
Payer: COMMERCIAL

## 2022-03-17 VITALS
SYSTOLIC BLOOD PRESSURE: 103 MMHG | DIASTOLIC BLOOD PRESSURE: 66 MMHG | OXYGEN SATURATION: 99 % | HEART RATE: 72 BPM | RESPIRATION RATE: 18 BRPM | BODY MASS INDEX: 23.83 KG/M2 | TEMPERATURE: 98 F | WEIGHT: 141.3 LBS

## 2022-03-17 DIAGNOSIS — Z90.710 S/P VAGINAL HYSTERECTOMY: Primary | ICD-10-CM

## 2022-03-17 LAB
ABO/RH(D): NORMAL
ANTIBODY SCREEN: NEGATIVE
BASOPHILS # BLD AUTO: 0.1 10E3/UL (ref 0–0.2)
BASOPHILS NFR BLD AUTO: 1 %
EOSINOPHIL # BLD AUTO: 0.2 10E3/UL (ref 0–0.7)
EOSINOPHIL NFR BLD AUTO: 3 %
ERYTHROCYTE [DISTWIDTH] IN BLOOD BY AUTOMATED COUNT: 13.5 % (ref 10–15)
HCG SERPL-ACNC: <2 MLU/ML (ref 0–4)
HCT VFR BLD AUTO: 39.8 % (ref 35–47)
HGB BLD-MCNC: 13.1 G/DL (ref 11.7–15.7)
IMM GRANULOCYTES # BLD: 0 10E3/UL
IMM GRANULOCYTES NFR BLD: 0 %
LYMPHOCYTES # BLD AUTO: 1.4 10E3/UL (ref 0.8–5.3)
LYMPHOCYTES NFR BLD AUTO: 17 %
MCH RBC QN AUTO: 26.3 PG (ref 26.5–33)
MCHC RBC AUTO-ENTMCNC: 32.9 G/DL (ref 31.5–36.5)
MCV RBC AUTO: 80 FL (ref 78–100)
MONOCYTES # BLD AUTO: 0.6 10E3/UL (ref 0–1.3)
MONOCYTES NFR BLD AUTO: 7 %
NEUTROPHILS # BLD AUTO: 6.1 10E3/UL (ref 1.6–8.3)
NEUTROPHILS NFR BLD AUTO: 72 %
NRBC # BLD AUTO: 0 10E3/UL
NRBC BLD AUTO-RTO: 0 /100
PLATELET # BLD AUTO: 257 10E3/UL (ref 150–450)
RBC # BLD AUTO: 4.98 10E6/UL (ref 3.8–5.2)
SPECIMEN EXPIRATION DATE: NORMAL
WBC # BLD AUTO: 8.3 10E3/UL (ref 4–11)

## 2022-03-17 PROCEDURE — 258N000003 HC RX IP 258 OP 636: Performed by: ANESTHESIOLOGY

## 2022-03-17 PROCEDURE — 250N000011 HC RX IP 250 OP 636: Performed by: NURSE PRACTITIONER

## 2022-03-17 PROCEDURE — 250N000026 HC DESFLURANE, PER MIN: Performed by: STUDENT IN AN ORGANIZED HEALTH CARE EDUCATION/TRAINING PROGRAM

## 2022-03-17 PROCEDURE — 250N000013 HC RX MED GY IP 250 OP 250 PS 637: Performed by: STUDENT IN AN ORGANIZED HEALTH CARE EDUCATION/TRAINING PROGRAM

## 2022-03-17 PROCEDURE — 250N000009 HC RX 250

## 2022-03-17 PROCEDURE — 272N000001 HC OR GENERAL SUPPLY STERILE: Performed by: STUDENT IN AN ORGANIZED HEALTH CARE EDUCATION/TRAINING PROGRAM

## 2022-03-17 PROCEDURE — 36415 COLL VENOUS BLD VENIPUNCTURE: CPT | Performed by: NURSE PRACTITIONER

## 2022-03-17 PROCEDURE — 370N000017 HC ANESTHESIA TECHNICAL FEE, PER MIN: Performed by: STUDENT IN AN ORGANIZED HEALTH CARE EDUCATION/TRAINING PROGRAM

## 2022-03-17 PROCEDURE — 84702 CHORIONIC GONADOTROPIN TEST: CPT | Performed by: NURSE PRACTITIONER

## 2022-03-17 PROCEDURE — 250N000013 HC RX MED GY IP 250 OP 250 PS 637: Performed by: ANESTHESIOLOGY

## 2022-03-17 PROCEDURE — 250N000009 HC RX 250: Performed by: STUDENT IN AN ORGANIZED HEALTH CARE EDUCATION/TRAINING PROGRAM

## 2022-03-17 PROCEDURE — 86850 RBC ANTIBODY SCREEN: CPT | Performed by: NURSE PRACTITIONER

## 2022-03-17 PROCEDURE — 710N000009 HC RECOVERY PHASE 1, LEVEL 1, PER MIN: Performed by: STUDENT IN AN ORGANIZED HEALTH CARE EDUCATION/TRAINING PROGRAM

## 2022-03-17 PROCEDURE — 250N000009 HC RX 250: Performed by: ANESTHESIOLOGY

## 2022-03-17 PROCEDURE — 250N000011 HC RX IP 250 OP 636: Performed by: ANESTHESIOLOGY

## 2022-03-17 PROCEDURE — 250N000025 HC SEVOFLURANE, PER MIN: Performed by: STUDENT IN AN ORGANIZED HEALTH CARE EDUCATION/TRAINING PROGRAM

## 2022-03-17 PROCEDURE — 88307 TISSUE EXAM BY PATHOLOGIST: CPT | Mod: TC | Performed by: STUDENT IN AN ORGANIZED HEALTH CARE EDUCATION/TRAINING PROGRAM

## 2022-03-17 PROCEDURE — 88307 TISSUE EXAM BY PATHOLOGIST: CPT | Mod: 26 | Performed by: PATHOLOGY

## 2022-03-17 PROCEDURE — 710N000012 HC RECOVERY PHASE 2, PER MINUTE: Performed by: STUDENT IN AN ORGANIZED HEALTH CARE EDUCATION/TRAINING PROGRAM

## 2022-03-17 PROCEDURE — 360N000076 HC SURGERY LEVEL 3, PER MIN: Performed by: STUDENT IN AN ORGANIZED HEALTH CARE EDUCATION/TRAINING PROGRAM

## 2022-03-17 PROCEDURE — 999N000141 HC STATISTIC PRE-PROCEDURE NURSING ASSESSMENT: Performed by: STUDENT IN AN ORGANIZED HEALTH CARE EDUCATION/TRAINING PROGRAM

## 2022-03-17 PROCEDURE — 250N000011 HC RX IP 250 OP 636

## 2022-03-17 PROCEDURE — 86901 BLOOD TYPING SEROLOGIC RH(D): CPT | Performed by: NURSE PRACTITIONER

## 2022-03-17 PROCEDURE — 85025 COMPLETE CBC W/AUTO DIFF WBC: CPT | Performed by: NURSE PRACTITIONER

## 2022-03-17 PROCEDURE — 258N000003 HC RX IP 258 OP 636

## 2022-03-17 RX ORDER — PROPOFOL 10 MG/ML
INJECTION, EMULSION INTRAVENOUS PRN
Status: DISCONTINUED | OUTPATIENT
Start: 2022-03-17 | End: 2022-03-17

## 2022-03-17 RX ORDER — CEFAZOLIN SODIUM/WATER 2 G/20 ML
2 SYRINGE (ML) INTRAVENOUS
Status: COMPLETED | OUTPATIENT
Start: 2022-03-17 | End: 2022-03-17

## 2022-03-17 RX ORDER — OXYCODONE HYDROCHLORIDE 5 MG/1
5 TABLET ORAL
Status: COMPLETED | OUTPATIENT
Start: 2022-03-17 | End: 2022-03-17

## 2022-03-17 RX ORDER — MAGNESIUM SULFATE 4 G/50ML
4 INJECTION INTRAVENOUS ONCE
Status: COMPLETED | OUTPATIENT
Start: 2022-03-17 | End: 2022-03-17

## 2022-03-17 RX ORDER — NALOXONE HYDROCHLORIDE 0.4 MG/ML
0.2 INJECTION, SOLUTION INTRAMUSCULAR; INTRAVENOUS; SUBCUTANEOUS
Status: DISCONTINUED | OUTPATIENT
Start: 2022-03-17 | End: 2022-03-17 | Stop reason: HOSPADM

## 2022-03-17 RX ORDER — FENTANYL CITRATE 50 UG/ML
INJECTION, SOLUTION INTRAMUSCULAR; INTRAVENOUS PRN
Status: DISCONTINUED | OUTPATIENT
Start: 2022-03-17 | End: 2022-03-17

## 2022-03-17 RX ORDER — AMOXICILLIN 250 MG
1-2 CAPSULE ORAL 2 TIMES DAILY
Qty: 30 TABLET | Refills: 0 | Status: SHIPPED | OUTPATIENT
Start: 2022-03-17 | End: 2023-11-13

## 2022-03-17 RX ORDER — DEXAMETHASONE SODIUM PHOSPHATE 4 MG/ML
INJECTION, SOLUTION INTRA-ARTICULAR; INTRALESIONAL; INTRAMUSCULAR; INTRAVENOUS; SOFT TISSUE PRN
Status: DISCONTINUED | OUTPATIENT
Start: 2022-03-17 | End: 2022-03-17

## 2022-03-17 RX ORDER — KETAMINE HYDROCHLORIDE 50 MG/ML
INJECTION, SOLUTION INTRAMUSCULAR; INTRAVENOUS PRN
Status: DISCONTINUED | OUTPATIENT
Start: 2022-03-17 | End: 2022-03-17

## 2022-03-17 RX ORDER — ONDANSETRON 2 MG/ML
INJECTION INTRAMUSCULAR; INTRAVENOUS PRN
Status: DISCONTINUED | OUTPATIENT
Start: 2022-03-17 | End: 2022-03-17

## 2022-03-17 RX ORDER — SODIUM CHLORIDE, SODIUM LACTATE, POTASSIUM CHLORIDE, CALCIUM CHLORIDE 600; 310; 30; 20 MG/100ML; MG/100ML; MG/100ML; MG/100ML
INJECTION, SOLUTION INTRAVENOUS CONTINUOUS
Status: DISCONTINUED | OUTPATIENT
Start: 2022-03-17 | End: 2022-03-17 | Stop reason: HOSPADM

## 2022-03-17 RX ORDER — ONDANSETRON 4 MG/1
4 TABLET, ORALLY DISINTEGRATING ORAL EVERY 30 MIN PRN
Status: DISCONTINUED | OUTPATIENT
Start: 2022-03-17 | End: 2022-03-17 | Stop reason: HOSPADM

## 2022-03-17 RX ORDER — NALOXONE HYDROCHLORIDE 0.4 MG/ML
0.4 INJECTION, SOLUTION INTRAMUSCULAR; INTRAVENOUS; SUBCUTANEOUS
Status: DISCONTINUED | OUTPATIENT
Start: 2022-03-17 | End: 2022-03-17 | Stop reason: HOSPADM

## 2022-03-17 RX ORDER — CEFAZOLIN SODIUM/WATER 2 G/20 ML
2 SYRINGE (ML) INTRAVENOUS SEE ADMIN INSTRUCTIONS
Status: DISCONTINUED | OUTPATIENT
Start: 2022-03-17 | End: 2022-03-17 | Stop reason: HOSPADM

## 2022-03-17 RX ORDER — KETOROLAC TROMETHAMINE 30 MG/ML
INJECTION, SOLUTION INTRAMUSCULAR; INTRAVENOUS PRN
Status: DISCONTINUED | OUTPATIENT
Start: 2022-03-17 | End: 2022-03-17

## 2022-03-17 RX ORDER — GLYCOPYRROLATE 0.2 MG/ML
INJECTION, SOLUTION INTRAMUSCULAR; INTRAVENOUS PRN
Status: DISCONTINUED | OUTPATIENT
Start: 2022-03-17 | End: 2022-03-17

## 2022-03-17 RX ORDER — LIDOCAINE 40 MG/G
CREAM TOPICAL
Status: DISCONTINUED | OUTPATIENT
Start: 2022-03-17 | End: 2022-03-17 | Stop reason: HOSPADM

## 2022-03-17 RX ORDER — MAGNESIUM HYDROXIDE 1200 MG/15ML
LIQUID ORAL PRN
Status: DISCONTINUED | OUTPATIENT
Start: 2022-03-17 | End: 2022-03-17 | Stop reason: HOSPADM

## 2022-03-17 RX ORDER — SCOLOPAMINE TRANSDERMAL SYSTEM 1 MG/1
1 PATCH, EXTENDED RELEASE TRANSDERMAL
Status: DISCONTINUED | OUTPATIENT
Start: 2022-03-17 | End: 2022-03-17 | Stop reason: HOSPADM

## 2022-03-17 RX ORDER — MEPERIDINE HYDROCHLORIDE 25 MG/ML
12.5 INJECTION INTRAMUSCULAR; INTRAVENOUS; SUBCUTANEOUS EVERY 5 MIN PRN
Status: DISCONTINUED | OUTPATIENT
Start: 2022-03-17 | End: 2022-03-17 | Stop reason: HOSPADM

## 2022-03-17 RX ORDER — OXYCODONE HYDROCHLORIDE 5 MG/1
5-10 TABLET ORAL EVERY 4 HOURS PRN
Status: DISCONTINUED | OUTPATIENT
Start: 2022-03-17 | End: 2022-03-17 | Stop reason: HOSPADM

## 2022-03-17 RX ORDER — ACETAMINOPHEN 325 MG/1
975 TABLET ORAL ONCE
Status: DISCONTINUED | OUTPATIENT
Start: 2022-03-17 | End: 2022-03-17

## 2022-03-17 RX ORDER — OXYCODONE HYDROCHLORIDE 5 MG/1
5-10 TABLET ORAL EVERY 4 HOURS PRN
Qty: 10 TABLET | Refills: 0 | Status: SHIPPED | OUTPATIENT
Start: 2022-03-17 | End: 2022-12-05

## 2022-03-17 RX ORDER — IBUPROFEN 200 MG
800 TABLET ORAL ONCE
Status: COMPLETED | OUTPATIENT
Start: 2022-03-17 | End: 2022-03-17

## 2022-03-17 RX ORDER — ACETAMINOPHEN 325 MG/1
975 TABLET ORAL ONCE
Status: COMPLETED | OUTPATIENT
Start: 2022-03-17 | End: 2022-03-17

## 2022-03-17 RX ORDER — FENTANYL CITRATE 50 UG/ML
50 INJECTION, SOLUTION INTRAMUSCULAR; INTRAVENOUS EVERY 5 MIN PRN
Status: DISCONTINUED | OUTPATIENT
Start: 2022-03-17 | End: 2022-03-17 | Stop reason: HOSPADM

## 2022-03-17 RX ORDER — ACETAMINOPHEN 325 MG/1
975 TABLET ORAL EVERY 6 HOURS PRN
Qty: 50 TABLET | Refills: 0 | Status: SHIPPED | OUTPATIENT
Start: 2022-03-17 | End: 2023-11-13

## 2022-03-17 RX ORDER — ONDANSETRON 4 MG/1
4-8 TABLET, ORALLY DISINTEGRATING ORAL EVERY 8 HOURS PRN
Qty: 4 TABLET | Refills: 0 | Status: SHIPPED | OUTPATIENT
Start: 2022-03-17 | End: 2023-11-13

## 2022-03-17 RX ORDER — SODIUM CHLORIDE, SODIUM LACTATE, POTASSIUM CHLORIDE, CALCIUM CHLORIDE 600; 310; 30; 20 MG/100ML; MG/100ML; MG/100ML; MG/100ML
INJECTION, SOLUTION INTRAVENOUS CONTINUOUS PRN
Status: DISCONTINUED | OUTPATIENT
Start: 2022-03-17 | End: 2022-03-17

## 2022-03-17 RX ORDER — IBUPROFEN 800 MG/1
800 TABLET, FILM COATED ORAL EVERY 6 HOURS PRN
Qty: 30 TABLET | Refills: 0 | Status: SHIPPED | OUTPATIENT
Start: 2022-03-17 | End: 2023-11-13

## 2022-03-17 RX ORDER — ONDANSETRON 2 MG/ML
4 INJECTION INTRAMUSCULAR; INTRAVENOUS EVERY 30 MIN PRN
Status: DISCONTINUED | OUTPATIENT
Start: 2022-03-17 | End: 2022-03-17 | Stop reason: HOSPADM

## 2022-03-17 RX ORDER — LIDOCAINE HYDROCHLORIDE AND EPINEPHRINE 10; 10 MG/ML; UG/ML
INJECTION, SOLUTION INFILTRATION; PERINEURAL PRN
Status: DISCONTINUED | OUTPATIENT
Start: 2022-03-17 | End: 2022-03-17 | Stop reason: HOSPADM

## 2022-03-17 RX ORDER — LIDOCAINE HYDROCHLORIDE 20 MG/ML
INJECTION, SOLUTION INFILTRATION; PERINEURAL PRN
Status: DISCONTINUED | OUTPATIENT
Start: 2022-03-17 | End: 2022-03-17

## 2022-03-17 RX ORDER — HYDROMORPHONE HYDROCHLORIDE 1 MG/ML
0.4 INJECTION, SOLUTION INTRAMUSCULAR; INTRAVENOUS; SUBCUTANEOUS EVERY 5 MIN PRN
Status: DISCONTINUED | OUTPATIENT
Start: 2022-03-17 | End: 2022-03-17 | Stop reason: HOSPADM

## 2022-03-17 RX ADMIN — KETOROLAC TROMETHAMINE 15 MG: 30 INJECTION, SOLUTION INTRAMUSCULAR at 09:49

## 2022-03-17 RX ADMIN — IBUPROFEN 800 MG: 200 TABLET, FILM COATED ORAL at 15:09

## 2022-03-17 RX ADMIN — KETAMINE HYDROCHLORIDE 50 MG: 50 INJECTION, SOLUTION INTRAMUSCULAR; INTRAVENOUS at 07:34

## 2022-03-17 RX ADMIN — PHENYLEPHRINE HYDROCHLORIDE 100 MCG: 10 INJECTION INTRAVENOUS at 09:20

## 2022-03-17 RX ADMIN — PHENYLEPHRINE HYDROCHLORIDE 100 MCG: 10 INJECTION INTRAVENOUS at 08:47

## 2022-03-17 RX ADMIN — PHENYLEPHRINE HYDROCHLORIDE 100 MCG: 10 INJECTION INTRAVENOUS at 09:37

## 2022-03-17 RX ADMIN — PHENYLEPHRINE HYDROCHLORIDE 100 MCG: 10 INJECTION INTRAVENOUS at 07:41

## 2022-03-17 RX ADMIN — ROCURONIUM BROMIDE 50 MG: 50 INJECTION, SOLUTION INTRAVENOUS at 07:34

## 2022-03-17 RX ADMIN — FENTANYL CITRATE 50 MCG: 50 INJECTION, SOLUTION INTRAMUSCULAR; INTRAVENOUS at 10:25

## 2022-03-17 RX ADMIN — SCOPALAMINE 1 PATCH: 1 PATCH, EXTENDED RELEASE TRANSDERMAL at 07:10

## 2022-03-17 RX ADMIN — LIDOCAINE HYDROCHLORIDE 60 MG: 20 INJECTION, SOLUTION INFILTRATION; PERINEURAL at 07:34

## 2022-03-17 RX ADMIN — MAGNESIUM SULFATE HEPTAHYDRATE 4 G: 80 INJECTION, SOLUTION INTRAVENOUS at 06:50

## 2022-03-17 RX ADMIN — PHENYLEPHRINE HYDROCHLORIDE 100 MCG: 10 INJECTION INTRAVENOUS at 08:15

## 2022-03-17 RX ADMIN — DEXAMETHASONE SODIUM PHOSPHATE 10 MG: 4 INJECTION, SOLUTION INTRA-ARTICULAR; INTRALESIONAL; INTRAMUSCULAR; INTRAVENOUS; SOFT TISSUE at 07:34

## 2022-03-17 RX ADMIN — GLYCOPYRROLATE 0.2 MG: 0.2 INJECTION, SOLUTION INTRAMUSCULAR; INTRAVENOUS at 08:03

## 2022-03-17 RX ADMIN — SUGAMMADEX 200 MG: 100 INJECTION, SOLUTION INTRAVENOUS at 09:53

## 2022-03-17 RX ADMIN — SODIUM CHLORIDE, POTASSIUM CHLORIDE, SODIUM LACTATE AND CALCIUM CHLORIDE: 600; 310; 30; 20 INJECTION, SOLUTION INTRAVENOUS at 06:41

## 2022-03-17 RX ADMIN — SODIUM CHLORIDE, POTASSIUM CHLORIDE, SODIUM LACTATE AND CALCIUM CHLORIDE: 600; 310; 30; 20 INJECTION, SOLUTION INTRAVENOUS at 11:30

## 2022-03-17 RX ADMIN — SODIUM CHLORIDE, SODIUM LACTATE, POTASSIUM CHLORIDE, CALCIUM CHLORIDE: 600; 310; 30; 20 INJECTION, SOLUTION INTRAVENOUS at 08:19

## 2022-03-17 RX ADMIN — PHENYLEPHRINE HYDROCHLORIDE 100 MCG: 10 INJECTION INTRAVENOUS at 08:59

## 2022-03-17 RX ADMIN — PHENYLEPHRINE HYDROCHLORIDE 100 MCG: 10 INJECTION INTRAVENOUS at 09:09

## 2022-03-17 RX ADMIN — ACETAMINOPHEN 975 MG: 325 TABLET ORAL at 06:51

## 2022-03-17 RX ADMIN — ACETAMINOPHEN 975 MG: 325 TABLET ORAL at 15:08

## 2022-03-17 RX ADMIN — OXYCODONE HYDROCHLORIDE 5 MG: 5 TABLET ORAL at 15:10

## 2022-03-17 RX ADMIN — PHENYLEPHRINE HYDROCHLORIDE 100 MCG: 10 INJECTION INTRAVENOUS at 08:26

## 2022-03-17 RX ADMIN — FENTANYL CITRATE 100 MCG: 50 INJECTION, SOLUTION INTRAMUSCULAR; INTRAVENOUS at 07:34

## 2022-03-17 RX ADMIN — PROPOFOL 140 MG: 10 INJECTION, EMULSION INTRAVENOUS at 07:34

## 2022-03-17 RX ADMIN — Medication 2 G: at 07:49

## 2022-03-17 RX ADMIN — ONDANSETRON 4 MG: 2 INJECTION INTRAMUSCULAR; INTRAVENOUS at 09:49

## 2022-03-17 RX ADMIN — PHENYLEPHRINE HYDROCHLORIDE 100 MCG: 10 INJECTION INTRAVENOUS at 08:33

## 2022-03-17 RX ADMIN — PHENYLEPHRINE HYDROCHLORIDE 200 MCG: 10 INJECTION INTRAVENOUS at 07:52

## 2022-03-17 RX ADMIN — MIDAZOLAM 2 MG: 1 INJECTION INTRAMUSCULAR; INTRAVENOUS at 07:33

## 2022-03-17 RX ADMIN — SODIUM CHLORIDE, SODIUM LACTATE, POTASSIUM CHLORIDE, CALCIUM CHLORIDE: 600; 310; 30; 20 INJECTION, SOLUTION INTRAVENOUS at 07:30

## 2022-03-17 NOTE — ANESTHESIA PREPROCEDURE EVALUATION
Anesthesia Pre-Procedure Evaluation    Patient: Triny Corcoran   MRN: [id number] : 1975        Procedure : Procedure(s):  TOTAL VAGINAL HYSTERECTOMY BILATERAL SALINGECTOMY          Past Medical History:   Diagnosis Date     Anxiety      H/O: depression      Idiopathic edema      PONV (postoperative nausea and vomiting)      S/P colonoscopy 10/17/2011      Past Surgical History:   Procedure Laterality Date     TONSILLECTOMY        No Known Allergies   Social History     Tobacco Use     Smoking status: Never Smoker     Smokeless tobacco: Never Used   Substance Use Topics     Alcohol use: Not on file     Comment: Alcoholic Drinks/day: rare, less than 1 drink per week       Wt Readings from Last 1 Encounters:   22 64.1 kg (141 lb 4.8 oz)        Anesthesia Evaluation   Pt has had prior anesthetic.     History of anesthetic complications  - PONV.      ROS/MED HX  ENT/Pulmonary:  - neg pulmonary ROS     Neurologic:  - neg neurologic ROS     Cardiovascular:  - neg cardiovascular ROS     METS/Exercise Tolerance:     Hematologic:       Musculoskeletal:       GI/Hepatic:  - neg GI/hepatic ROS     Renal/Genitourinary:  - neg Renal ROS     Endo:  - neg endo ROS     Psychiatric/Substance Use:       Infectious Disease:       Malignancy:       Other:            Physical Exam    Airway        Mallampati: II   TM distance: > 3 FB   Neck ROM: full     Respiratory Devices and Support         Dental  no notable dental history         Cardiovascular   cardiovascular exam normal          Pulmonary   pulmonary exam normal                OUTSIDE LABS:  CBC:   Lab Results   Component Value Date    WBC 8.3 2022    WBC 8.7 2022    HGB 13.1 2022    HGB 12.0 2022    HCT 39.8 2022    HCT 36.4 2022     2022     2022     BMP:   Lab Results   Component Value Date     2022     2021    POTASSIUM 3.7 2022    POTASSIUM 3.5 2021    CHLORIDE  103 02/28/2022    CHLORIDE 102 03/23/2021    CO2 24 02/28/2022    CO2 24 03/23/2021    BUN 13 02/28/2022    BUN 14 03/23/2021    CR 0.75 02/28/2022    CR 0.79 03/23/2021    GLC 90 02/28/2022    GLC 90 03/23/2021     COAGS: No results found for: PTT, INR, FIBR  POC: No results found for: BGM, HCG, HCGS  HEPATIC: No results found for: ALBUMIN, PROTTOTAL, ALT, AST, GGT, ALKPHOS, BILITOTAL, BILIDIRECT, ALEIDA  OTHER:   Lab Results   Component Value Date    NATASHA 9.2 02/28/2022       Anesthesia Plan    ASA Status:  2      Anesthesia Type: General.     - Airway: ETT              Consents    Anesthesia Plan(s) and associated risks, benefits, and realistic alternatives discussed. Questions answered and patient/representative(s) expressed understanding.     - Discussed: Risks, Benefits and Alternatives for BOTH SEDATION and the PROCEDURE were discussed     - Discussed with:  Patient         Postoperative Care    Pain management: Multi-modal analgesia.        Comments:                Catalina Jones MD

## 2022-03-17 NOTE — PHARMACY-ADMISSION MEDICATION HISTORY
Pharmacy Note - Admission Medication History    Pertinent Provider Information: none   ______________________________________________________________________    Prior To Admission (PTA) med list completed and updated in EMR.       PTA Med List   Medication Sig Last Dose     ketoconazole (NIZORAL) 2 % external shampoo Apply topically daily as needed for itching or irritation ~ 2 weeks ago     LORazepam (ATIVAN) 0.5 MG tablet [LORAZEPAM (ATIVAN) 0.5 MG TABLET] Take 1 tablet (0.5 mg total) by mouth every 8 (eight) hours as needed for anxiety. not recently     sertraline (ZOLOFT) 50 MG tablet Take 1.5 tablets (75 mg) by mouth daily 3/16/2022 at 2pm     triamterene-HCTZ (MAXZIDE) 75-50 MG tablet Take 1 tablet by mouth daily Due for appointment 3/16/2022 at 2pm       Information source(s): Patient, Clinic records and [facility]    Method of interview communication: in-person    Patient was asked about OTC/herbal products specifically.  PTA med list reflects this.    Based on the pharmacist's assessment, the PTA med list information appears reliable    Allergies were reviewed, assessed, and updated with the patient.      Patient does not anticipate needing any multi-use medications during admission.     Thank you for the opportunity to participate in the care of this patient.      Jagdish Holguin RPH     3/17/2022     6:38 AM

## 2022-03-17 NOTE — ANESTHESIA POSTPROCEDURE EVALUATION
Patient: Triny Corcoran    Procedure: Procedure(s):  TOTAL VAGINAL HYSTERECTOMY BILATERAL SALINGECTOMY       Anesthesia Type:  General    Note:  Disposition: Outpatient   Postop Pain Control: Uneventful            Sign Out: Well controlled pain   PONV: No   Neuro/Psych: Uneventful            Sign Out: Acceptable/Baseline neuro status   Airway/Respiratory: Uneventful            Sign Out: Acceptable/Baseline resp. status   CV/Hemodynamics: Uneventful            Sign Out: Acceptable CV status; No obvious hypovolemia; No obvious fluid overload   Other NRE:    DID A NON-ROUTINE EVENT OCCUR?            Last vitals:  Vitals Value Taken Time   BP 86/54 03/17/22 1140   Temp 37.2  C (99  F) 03/17/22 1050   Pulse 66 03/17/22 1146   Resp 25 03/17/22 1146   SpO2 97 % 03/17/22 1146   Vitals shown include unvalidated device data.    Electronically Signed By: Neris Grande MD  March 17, 2022  11:48 AM

## 2022-03-17 NOTE — ANESTHESIA PROCEDURE NOTES
Airway       Patient location during procedure: OR       Procedure Start/Stop Times: 3/17/2022 7:35 AM  Staff -        Other Anesthesia Staff: Margot Villatoro       Performed By: SRNA  Consent for Airway        Urgency: elective  Indications and Patient Condition       Indications for airway management: krzysztof-procedural       Induction type:intravenous       Mask difficulty assessment: 1 - vent by mask    Final Airway Details       Final airway type: endotracheal airway       Successful airway: ETT - single  Endotracheal Airway Details        ETT size (mm): 7.0       Cuffed: yes       Successful intubation technique: direct laryngoscopy       DL Blade Type: MAC 3       Grade View of Cords: 1       Adjucts: stylet and tooth guard       Position: Right       Measured from: gums/teeth       Secured at (cm): 21       Bite block used: None    Post intubation assessment        Placement verified by: capnometry, equal breath sounds and chest rise        Number of attempts at approach: 1       Number of other approaches attempted: 0       Secured with: silk tape       Ease of procedure: easy       Dentition: Intact and Unchanged

## 2022-03-17 NOTE — INTERVAL H&P NOTE
I have reviewed the surgical (or preoperative) H&P that is linked to this encounter, and examined the patient. There are no significant changes.  Patient seen and interviewed in pre-op holding area.  Reviewed risks of pain, bleeding, infection, damage to  nearby organs including bowel, bladder, ureters possibly requiring additional surgery for repair.  Reviewed plan to remove uterus and cervix, discussed plan to assess for removal of tubes if they are accessible.  Discussed that removal of tubes may decrease risk of future ovarian cancer, discussed plan to leave ovaries in place which will continue to provide hormones until menopause.  Discussed possibility if unable to remove tubes may leave them behind.  Discussed expected post-op recovery including possible discharge today from PACU and pelvic rest/no heavy lifting for 6 weeks after surgery. All questions answered.     MD Jefry    Clinical Conditions Present on Arrival:  SECTIONPRESENTONADMISSIONBEGIN@

## 2022-03-17 NOTE — OP NOTE
OPERATIVE NOTE - VAGINAL HYSTERECTOMY      Pre-Op Diagnosis:     Post-Op Diagnosis: Same    Anesthesia: General Endotracheal    Surgeon: Bozena Lovett MD  Assistant:Sheron Hooper    Indication for Surgery: Triny is a 46 year old  with AUB.  She declined medical management and desired definitive surgical management with a hysterectomy.  Risks, benefits and alternatives reviewed and she desired to proceed.    Findings: Normal external genitalia, normal cervix, normal ovaries and tubes bilaterally    Description of Operation  The patient was placed under general anesthesia with endotracheal intubation by the anesthesia team. She was then prepped and draped in the lithotomy position.  Surgical briefing and timeout were performed.    A weighted speculum was placed in the vagina. The cervix was grasped with a 2 single toothed tenaculums. The cervical vaginal junction was injected with dilute marcaine/epinephrine solution. Incision was made at the cervico-vaginal junction with electrocautery, and the anterior and posterior cul du sacs were dissected. The posterior cul de sac was entered sharply with rosales scissors.  The uterosacral ligaments were clamped, cut and suture ligated with 0-vicryl and tagged.     The cardinal ligaments were then clamped, cut and suture ligated using 0-vicryl suture. The anterior peritoneum was then entered sharply.  The  Bladder was back-filled with saline to aid in tissue dissection.  Bladder then allowed to drain. The round and broad ligaments were clamped, cut and suture ligated using 0-vicryl. The utero-ovarian ligaments were clamped, cut and suture ligated with 0-vicryl. The uterus was removed and handed off for routine pathology. The bilateral fallopian tubes were elevated and mesosalpinx clamped, cut and suture ligated with 0-vicryl. The fallopian tubes were sent with the uterus. Hemostasis was observed.  The pedicles were inspected and remained hemostatic.    The uterosacral  ligaments were incorporated with the apices of the vaginal cuff using 0-vicryl.  The remainder of the vaginal cuff was then closed with figure of X sutures of 0-vicryl.  Cuff was noted to be hemostatic.  Procedure was then terminated.  Patient was taken to the recovery room in stable condition.  The stinson was removed.        Bozena Lovett MD

## 2022-03-17 NOTE — PLAN OF CARE
Goal Outcome Evaluation:    Plan of Care Reviewed With: patient, spouse          Outcome Evaluation: discharged to home    Pt unable to void, feels the urge. Bladder scan at 1345 for 122ml. Torres d/c'd in OR.   Call to MD, discussed 250 fluid bolus, encourage to void prior to discharge   Later , Pt able to void 100 ml, post void scan is 115.

## 2022-03-17 NOTE — ANESTHESIA CARE TRANSFER NOTE
Patient: Triny Corcoran    Procedure: Procedure(s):  TOTAL VAGINAL HYSTERECTOMY BILATERAL SALINGECTOMY       Diagnosis: Abnormal uterine bleeding [N93.9]  Diagnosis Additional Information: No value filed.    Anesthesia Type:   General     Note:    Oropharynx: oropharynx clear of all foreign objects and spontaneously breathing  Level of Consciousness: awake  Oxygen Supplementation: face mask    Independent Airway: airway patency satisfactory and stable  Dentition: dentition unchanged  Vital Signs Stable: post-procedure vital signs reviewed and stable  Report to RN Given: handoff report given  Patient transferred to: PACU    Handoff Report: Identifed the Patient, Identified the Reponsible Provider, Reviewed the pertinent medical history, Discussed the surgical course, Reviewed Intra-OP anesthesia mangement and issues during anesthesia, Set expectations for post-procedure period and Allowed opportunity for questions and acknowledgement of understanding      Vitals:  Vitals Value Taken Time   /60 03/17/22 1004   Temp 36.8  C (98.2  F) 03/17/22 1004   Pulse 86 03/17/22 1007   Resp 20 03/17/22 1007   SpO2 99 % 03/17/22 1007   Vitals shown include unvalidated device data.    Electronically Signed By: Margot Villatoro  March 17, 2022  10:09 AM

## 2022-04-06 LAB
PATH REPORT.ADDENDUM SPEC: NORMAL
PATH REPORT.COMMENTS IMP SPEC: NORMAL
PATH REPORT.COMMENTS IMP SPEC: NORMAL
PATH REPORT.FINAL DX SPEC: NORMAL
PATH REPORT.GROSS SPEC: NORMAL
PATH REPORT.MICROSCOPIC SPEC OTHER STN: NORMAL
PATH REPORT.RELEVANT HX SPEC: NORMAL
PHOTO IMAGE: NORMAL

## 2022-07-30 ENCOUNTER — HEALTH MAINTENANCE LETTER (OUTPATIENT)
Age: 47
End: 2022-07-30

## 2022-08-30 ENCOUNTER — TRANSFERRED RECORDS (OUTPATIENT)
Dept: HEALTH INFORMATION MANAGEMENT | Facility: CLINIC | Age: 47
End: 2022-08-30

## 2022-10-10 ENCOUNTER — HEALTH MAINTENANCE LETTER (OUTPATIENT)
Age: 47
End: 2022-10-10

## 2022-11-28 DIAGNOSIS — L21.9 SEBORRHEIC DERMATITIS OF SCALP: ICD-10-CM

## 2022-11-28 DIAGNOSIS — F34.1 DYSTHYMIA: ICD-10-CM

## 2022-11-28 NOTE — TELEPHONE ENCOUNTER
Patient calling to request refill of attached medications.  Patient was unaware of need for appt based on last refill from 10/17/22.  However, she was very amenable to scheduling VV with writer today.  VV scheduled with Nelly Haque for Wednesday 11/30/22 at 12:30.  Patient also wanted to note that she has been out of medication at this time for about 1 week, but is very understanding that Nelly may not be willing to refill until she speaks with her on Wed.    Any questions, patient can be reached at [phone].

## 2022-11-29 DIAGNOSIS — F34.1 DYSTHYMIA: ICD-10-CM

## 2022-11-29 RX ORDER — KETOCONAZOLE 20 MG/ML
SHAMPOO TOPICAL DAILY PRN
Qty: 120 ML | Refills: 3 | Status: SHIPPED | OUTPATIENT
Start: 2022-11-29 | End: 2022-12-05

## 2022-12-05 ENCOUNTER — VIRTUAL VISIT (OUTPATIENT)
Dept: INTERNAL MEDICINE | Facility: CLINIC | Age: 47
End: 2022-12-05
Payer: COMMERCIAL

## 2022-12-05 DIAGNOSIS — F34.1 DYSTHYMIA: ICD-10-CM

## 2022-12-05 DIAGNOSIS — R60.9 IDIOPATHIC EDEMA: ICD-10-CM

## 2022-12-05 DIAGNOSIS — F41.1 GAD (GENERALIZED ANXIETY DISORDER): ICD-10-CM

## 2022-12-05 DIAGNOSIS — L21.9 SEBORRHEIC DERMATITIS OF SCALP: ICD-10-CM

## 2022-12-05 DIAGNOSIS — Z12.31 VISIT FOR SCREENING MAMMOGRAM: Primary | ICD-10-CM

## 2022-12-05 PROCEDURE — 99213 OFFICE O/P EST LOW 20 MIN: CPT | Mod: 95 | Performed by: NURSE PRACTITIONER

## 2022-12-05 RX ORDER — TRIAMTERENE AND HYDROCHLOROTHIAZIDE 75; 50 MG/1; MG/1
1 TABLET ORAL DAILY
Qty: 90 TABLET | Refills: 1 | Status: SHIPPED | OUTPATIENT
Start: 2022-12-05 | End: 2023-06-15

## 2022-12-05 RX ORDER — KETOCONAZOLE 20 MG/ML
SHAMPOO TOPICAL DAILY PRN
Qty: 120 ML | Refills: 5 | Status: SHIPPED | OUTPATIENT
Start: 2022-12-05 | End: 2023-11-13

## 2022-12-05 ASSESSMENT — PATIENT HEALTH QUESTIONNAIRE - PHQ9
SUM OF ALL RESPONSES TO PHQ QUESTIONS 1-9: 0
SUM OF ALL RESPONSES TO PHQ QUESTIONS 1-9: 0
10. IF YOU CHECKED OFF ANY PROBLEMS, HOW DIFFICULT HAVE THESE PROBLEMS MADE IT FOR YOU TO DO YOUR WORK, TAKE CARE OF THINGS AT HOME, OR GET ALONG WITH OTHER PEOPLE: NOT DIFFICULT AT ALL

## 2022-12-05 NOTE — PROGRESS NOTES
Triny is a 47 year old who is being evaluated via a billable video visit.      How would you like to obtain your AVS? Mail a copy  If the video visit is dropped, the invitation should be resent by: Text to cell phone: [phone]  Will anyone else be joining your video visit? No        Assessment & Plan   Problem List Items Addressed This Visit        Musculoskeletal and Integumentary    Seborrheic dermatitis of scalp     Continue ketoconazole shampoo as needed         Relevant Medications    ketoconazole (NIZORAL) 2 % external shampoo       Behavioral    EVELYN (generalized anxiety disorder)     Stable with sertraline 50 mg daily         Relevant Medications    sertraline (ZOLOFT) 50 MG tablet    Dysthymia     Continue sertraline 50 mg daily         Relevant Medications    sertraline (ZOLOFT) 50 MG tablet       Other    Idiopathic edema     Continue triamterene-hydrochlorothiazide.  Electrolyte check with next office visit.         Relevant Medications    triamterene-HCTZ (MAXZIDE) 75-50 MG tablet   Other Visit Diagnoses     Visit for screening mammogram    -  Primary    Relevant Orders    *MA Screening Digital Bilateral                  Return in about 4 months (around 4/5/2023) for Routine preventive.    Nelly Haque NP  [facility]   Triny is a 47 year old, presenting for the following health issues:  Recheck Medication    She continues a diuretic for LE edema. Blood pressure was reviewed. She has checked her blood pressure at [facility], reading was 120/68.     She is doing well from a mood standpoint, taking sertraline regularly.     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       History of Present Illness       Reason for visit:  Medication Recheck    She eats 2-3 servings of fruits and vegetables daily.She consumes 0 sweetened beverage(s) daily.She exercises with enough effort to increase her heart rate 60 or more minutes per day.  She exercises with enough effort to  increase her heart rate 7 days per week.   She is taking medications regularly.    Today's PHQ-9         PHQ-9 Total Score: 0    PHQ-9 Q9 Thoughts of better off dead/self-harm past 2 weeks :   Not at all    How difficult have these problems made it for you to do your work, take care of things at home, or get along with other people: Not difficult at all             Objective    Vitals - Patient Reported  Systolic (Patient Reported): 120  Diastolic (Patient Reported): 68      Vitals:  No vitals were obtained today due to virtual visit.    Physical Exam   GENERAL: Healthy, alert and no distress  EYES: Eyes grossly normal to inspection.  No discharge or erythema, or obvious scleral/conjunctival abnormalities.  RESP: No audible wheeze, cough, or visible cyanosis.  No visible retractions or increased work of breathing.    SKIN: Visible skin clear. No significant rash, abnormal pigmentation or lesions.  NEURO: Cranial nerves grossly intact.  Mentation and speech appropriate for age.  PSYCH: Mentation appears normal, affect normal/bright, judgement and insight intact, normal speech and appearance well-groomed.          Video-Visit Details    Video Start Time: 2:31 PM    Type of service:  Video Visit    Video End Time:2:40 PM    Originating Location (pt. Location): Work    Distant Location (provider location):  On-site    Platform used for Video Visit: Sree

## 2022-12-30 ENCOUNTER — MYC MEDICAL ADVICE (OUTPATIENT)
Dept: INTERNAL MEDICINE | Facility: CLINIC | Age: 47
End: 2022-12-30

## 2022-12-30 DIAGNOSIS — K59.01 SLOW TRANSIT CONSTIPATION: Primary | ICD-10-CM

## 2022-12-30 RX ORDER — AMOXICILLIN 250 MG
1 CAPSULE ORAL 2 TIMES DAILY PRN
Qty: 60 TABLET | Refills: 0 | Status: SHIPPED | OUTPATIENT
Start: 2022-12-30 | End: 2023-11-13

## 2023-01-04 DIAGNOSIS — F34.1 DYSTHYMIA: ICD-10-CM

## 2023-01-09 ENCOUNTER — ANCILLARY PROCEDURE (OUTPATIENT)
Dept: MAMMOGRAPHY | Facility: CLINIC | Age: 48
End: 2023-01-09
Attending: NURSE PRACTITIONER
Payer: COMMERCIAL

## 2023-01-09 DIAGNOSIS — Z12.31 VISIT FOR SCREENING MAMMOGRAM: ICD-10-CM

## 2023-01-09 PROCEDURE — 77067 SCR MAMMO BI INCL CAD: CPT

## 2023-06-13 DIAGNOSIS — R60.9 IDIOPATHIC EDEMA: ICD-10-CM

## 2023-06-15 RX ORDER — TRIAMTERENE AND HYDROCHLOROTHIAZIDE 75; 50 MG/1; MG/1
1 TABLET ORAL DAILY
Qty: 90 TABLET | Refills: 0 | Status: SHIPPED | OUTPATIENT
Start: 2023-06-15 | End: 2023-09-10

## 2023-06-15 NOTE — TELEPHONE ENCOUNTER
"Routing refill request to provider for review/approval because:  Labs not current:  Creatinine, sodium and potassium    Last Written Prescription Date:  12/5/2022  Last Fill Quantity: 90,  # refills: 1   Last office visit provider:  6/5/2022 VV     Requested Prescriptions   Pending Prescriptions Disp Refills     triamterene-HCTZ (MAXZIDE) 75-50 MG tablet 90 tablet 1     Sig: Take 1 tablet by mouth daily       Diuretics (Including Combos) Protocol Failed - 6/13/2023  3:26 PM        Failed - Normal serum creatinine on file in past 12 months     Recent Labs   Lab Test 02/28/22  1334   CR 0.75              Failed - Normal serum potassium on file in past 12 months     Recent Labs   Lab Test 02/28/22  1334   POTASSIUM 3.7                    Failed - Normal serum sodium on file in past 12 months     Recent Labs   Lab Test 02/28/22  1334                 Passed - Blood pressure under 140/90 in past 12 months     BP Readings from Last 3 Encounters:   03/17/22 103/66   02/28/22 104/60   02/20/20 120/60                 Passed - Recent (12 mo) or future (30 days) visit within the authorizing provider's specialty     Patient has had an office visit with the authorizing provider or a provider within the authorizing providers department within the previous 12 mos or has a future within next 30 days. See \"Patient Info\" tab in inbasket, or \"Choose Columns\" in Meds & Orders section of the refill encounter.              Passed - Medication is active on med list        Passed - Patient is age 18 or older        Passed - No active pregancy on record        Passed - No positive pregnancy test in past 12 months             Jennifer Saab RN 06/14/23 8:11 PM  "

## 2023-06-15 NOTE — TELEPHONE ENCOUNTER
Call patient: She is due for follow-up in the office and lab work. Please schedule a physical/med check for further refills.

## 2023-07-12 DIAGNOSIS — F34.1 DYSTHYMIA: ICD-10-CM

## 2023-07-12 NOTE — TELEPHONE ENCOUNTER
"Routing refill request to provider for review/approval because:  > 6 mos since last PHQ9    Last Written Prescription Date:  12/5/22  Last Fill Quantity: 135,  # refills: 1   Last office visit provider:  12/5/22     Requested Prescriptions   Pending Prescriptions Disp Refills    sertraline (ZOLOFT) 50 MG tablet 135 tablet 1     Sig: Take 1.5 tablets (75 mg) by mouth daily       SSRIs Protocol Failed - 7/12/2023  9:56 AM        Failed - PHQ-9 score less than 5 in past 6 months     Please review last PHQ-9 score.           Failed - Recent (6 mo) or future (30 days) visit within the authorizing provider's specialty     Patient had office visit in the last 6 months or has a visit in the next 30 days with authorizing provider or within the authorizing provider's specialty.  See \"Patient Info\" tab in inbasket, or \"Choose Columns\" in Meds & Orders section of the refill encounter.            Passed - Medication is active on med list        Passed - Patient is age 18 or older        Passed - No active pregnancy on record        Passed - No positive pregnancy test in last 12 months             Delphine Corley RN 07/12/23 1:51 PM  "

## 2023-08-19 ENCOUNTER — HEALTH MAINTENANCE LETTER (OUTPATIENT)
Age: 48
End: 2023-08-19

## 2023-09-10 ENCOUNTER — TELEPHONE (OUTPATIENT)
Dept: INTERNAL MEDICINE | Facility: CLINIC | Age: 48
End: 2023-09-10

## 2023-09-10 DIAGNOSIS — R60.9 IDIOPATHIC EDEMA: ICD-10-CM

## 2023-09-10 RX ORDER — TRIAMTERENE AND HYDROCHLOROTHIAZIDE 75; 50 MG/1; MG/1
1 TABLET ORAL DAILY
Qty: 60 TABLET | Refills: 0 | Status: SHIPPED | OUTPATIENT
Start: 2023-09-10 | End: 2023-11-13

## 2023-09-10 NOTE — TELEPHONE ENCOUNTER
"Routing refill request to provider for review/approval because:  Labs not current:  Creat, K+, NA    Last Written Prescription Date:  6/15/2023  Last Fill Quantity: 90,  # refills: 0   Last office visit provider:  12/5/2022     Requested Prescriptions   Pending Prescriptions Disp Refills    triamterene-HCTZ (MAXZIDE) 75-50 MG tablet [Pharmacy Med Name: TRIAMTERENE 75MG/ HCTZ 50MG TABLETS] 90 tablet 0     Sig: TAKE 1 TABLET BY MOUTH DAILY       Diuretics (Including Combos) Protocol Failed - 9/10/2023 12:14 PM        Failed - Normal serum creatinine on file in past 12 months     Recent Labs   Lab Test 02/28/22  1334   CR 0.75              Failed - Normal serum potassium on file in past 12 months     Recent Labs   Lab Test 02/28/22  1334   POTASSIUM 3.7                    Failed - Normal serum sodium on file in past 12 months     Recent Labs   Lab Test 02/28/22  1334                 Passed - Blood pressure under 140/90 in past 12 months     BP Readings from Last 3 Encounters:   03/17/22 103/66   02/28/22 104/60   02/20/20 120/60                 Passed - Recent (12 mo) or future (30 days) visit within the authorizing provider's specialty     Patient has had an office visit with the authorizing provider or a provider within the authorizing providers department within the previous 12 mos or has a future within next 30 days. See \"Patient Info\" tab in inbasket, or \"Choose Columns\" in Meds & Orders section of the refill encounter.              Passed - Medication is active on med list        Passed - Patient is age 18 or older        Passed - No active pregancy on record        Passed - No positive pregnancy test in past 12 months             WALLACE ISRAEL RN 09/10/23 12:14 PM  "

## 2023-09-11 NOTE — TELEPHONE ENCOUNTER
Please call patient -    ______________________________________________________________________     Home phone:  [phone] (home)     Cell phone:   Telephone Information:   Mobile [phone]       Other contacts:  Name Home Phone Work Phone Mobile Phone Relationship Lgl Kenneth   SHAILA PERSON [phone]  [phone] Spouse      ______________________________________________________________________     She is due for a physical with Nelly Haque DNP in clinic.    We did a 60 day refill of her triamterene/hydrochlorothiazide and she needs an appointment in this time frame.    Gabriel Connelly MD  [facility]  9/10/2023, 9:57 PM

## 2023-11-09 ENCOUNTER — MYC MEDICAL ADVICE (OUTPATIENT)
Dept: INTERNAL MEDICINE | Facility: CLINIC | Age: 48
End: 2023-11-09
Payer: COMMERCIAL

## 2023-11-12 ASSESSMENT — ANXIETY QUESTIONNAIRES
GAD7 TOTAL SCORE: 5
7. FEELING AFRAID AS IF SOMETHING AWFUL MIGHT HAPPEN: NOT AT ALL
IF YOU CHECKED OFF ANY PROBLEMS ON THIS QUESTIONNAIRE, HOW DIFFICULT HAVE THESE PROBLEMS MADE IT FOR YOU TO DO YOUR WORK, TAKE CARE OF THINGS AT HOME, OR GET ALONG WITH OTHER PEOPLE: SOMEWHAT DIFFICULT
5. BEING SO RESTLESS THAT IT IS HARD TO SIT STILL: NOT AT ALL
2. NOT BEING ABLE TO STOP OR CONTROL WORRYING: SEVERAL DAYS
GAD7 TOTAL SCORE: 5
6. BECOMING EASILY ANNOYED OR IRRITABLE: MORE THAN HALF THE DAYS
3. WORRYING TOO MUCH ABOUT DIFFERENT THINGS: SEVERAL DAYS
1. FEELING NERVOUS, ANXIOUS, OR ON EDGE: SEVERAL DAYS
4. TROUBLE RELAXING: NOT AT ALL

## 2023-11-12 ASSESSMENT — PATIENT HEALTH QUESTIONNAIRE - PHQ9
SUM OF ALL RESPONSES TO PHQ QUESTIONS 1-9: 5
10. IF YOU CHECKED OFF ANY PROBLEMS, HOW DIFFICULT HAVE THESE PROBLEMS MADE IT FOR YOU TO DO YOUR WORK, TAKE CARE OF THINGS AT HOME, OR GET ALONG WITH OTHER PEOPLE: SOMEWHAT DIFFICULT
SUM OF ALL RESPONSES TO PHQ QUESTIONS 1-9: 5

## 2023-11-13 ENCOUNTER — OFFICE VISIT (OUTPATIENT)
Dept: INTERNAL MEDICINE | Facility: CLINIC | Age: 48
End: 2023-11-13
Payer: COMMERCIAL

## 2023-11-13 VITALS — HEART RATE: 70 BPM | OXYGEN SATURATION: 99 % | DIASTOLIC BLOOD PRESSURE: 60 MMHG | SYSTOLIC BLOOD PRESSURE: 122 MMHG

## 2023-11-13 DIAGNOSIS — F34.1 DYSTHYMIA: ICD-10-CM

## 2023-11-13 DIAGNOSIS — L21.9 SEBORRHEIC DERMATITIS OF SCALP: ICD-10-CM

## 2023-11-13 DIAGNOSIS — R60.9 IDIOPATHIC EDEMA: ICD-10-CM

## 2023-11-13 DIAGNOSIS — F41.1 GAD (GENERALIZED ANXIETY DISORDER): Primary | ICD-10-CM

## 2023-11-13 DIAGNOSIS — R53.83 OTHER FATIGUE: ICD-10-CM

## 2023-11-13 LAB
ANION GAP SERPL CALCULATED.3IONS-SCNC: 14 MMOL/L (ref 7–15)
BUN SERPL-MCNC: 19.2 MG/DL (ref 6–20)
CALCIUM SERPL-MCNC: 10 MG/DL (ref 8.6–10)
CHLORIDE SERPL-SCNC: 102 MMOL/L (ref 98–107)
CREAT SERPL-MCNC: 0.9 MG/DL (ref 0.51–0.95)
DEPRECATED HCO3 PLAS-SCNC: 22 MMOL/L (ref 22–29)
EGFRCR SERPLBLD CKD-EPI 2021: 78 ML/MIN/1.73M2
ERYTHROCYTE [DISTWIDTH] IN BLOOD BY AUTOMATED COUNT: 13.2 % (ref 10–15)
GLUCOSE SERPL-MCNC: 101 MG/DL (ref 70–99)
HCT VFR BLD AUTO: 42.1 % (ref 35–47)
HGB BLD-MCNC: 14.8 G/DL (ref 11.7–15.7)
MCH RBC QN AUTO: 28.4 PG (ref 26.5–33)
MCHC RBC AUTO-ENTMCNC: 35.2 G/DL (ref 31.5–36.5)
MCV RBC AUTO: 81 FL (ref 78–100)
PLATELET # BLD AUTO: 286 10E3/UL (ref 150–450)
POTASSIUM SERPL-SCNC: 3.9 MMOL/L (ref 3.4–5.3)
RBC # BLD AUTO: 5.21 10E6/UL (ref 3.8–5.2)
SODIUM SERPL-SCNC: 138 MMOL/L (ref 135–145)
TSH SERPL DL<=0.005 MIU/L-ACNC: 2.9 UIU/ML (ref 0.3–4.2)
WBC # BLD AUTO: 9.1 10E3/UL (ref 4–11)

## 2023-11-13 PROCEDURE — 80048 BASIC METABOLIC PNL TOTAL CA: CPT | Performed by: NURSE PRACTITIONER

## 2023-11-13 PROCEDURE — 99214 OFFICE O/P EST MOD 30 MIN: CPT | Performed by: NURSE PRACTITIONER

## 2023-11-13 PROCEDURE — 90480 ADMN SARSCOV2 VAC 1/ONLY CMP: CPT | Performed by: NURSE PRACTITIONER

## 2023-11-13 PROCEDURE — 85027 COMPLETE CBC AUTOMATED: CPT | Performed by: NURSE PRACTITIONER

## 2023-11-13 PROCEDURE — 36415 COLL VENOUS BLD VENIPUNCTURE: CPT | Performed by: NURSE PRACTITIONER

## 2023-11-13 PROCEDURE — 84443 ASSAY THYROID STIM HORMONE: CPT | Performed by: NURSE PRACTITIONER

## 2023-11-13 PROCEDURE — 91320 SARSCV2 VAC 30MCG TRS-SUC IM: CPT | Performed by: NURSE PRACTITIONER

## 2023-11-13 RX ORDER — KETOCONAZOLE 20 MG/ML
SHAMPOO TOPICAL DAILY PRN
Qty: 120 ML | Refills: 5 | Status: SHIPPED | OUTPATIENT
Start: 2023-11-13

## 2023-11-13 RX ORDER — KETOCONAZOLE 20 MG/ML
SHAMPOO TOPICAL DAILY PRN
Qty: 120 ML | Refills: 5 | Status: CANCELLED | OUTPATIENT
Start: 2023-11-13

## 2023-11-13 RX ORDER — SERTRALINE HYDROCHLORIDE 100 MG/1
100 TABLET, FILM COATED ORAL DAILY
Qty: 90 TABLET | Refills: 3 | Status: SHIPPED | OUTPATIENT
Start: 2023-11-13

## 2023-11-13 RX ORDER — TRIAMTERENE AND HYDROCHLOROTHIAZIDE 75; 50 MG/1; MG/1
1 TABLET ORAL DAILY
Qty: 90 TABLET | Refills: 3 | Status: SHIPPED | OUTPATIENT
Start: 2023-11-13

## 2023-11-13 ASSESSMENT — PAIN SCALES - GENERAL: PAINLEVEL: NO PAIN (0)

## 2023-11-13 NOTE — ASSESSMENT & PLAN NOTE
Stable.  She notes difficulty splitting the pill in order to create 75 mg, we will increase the dose to 100 mg for ease of dosing the medication

## 2023-11-13 NOTE — ASSESSMENT & PLAN NOTE
Ketoconazole as needed   Bexarotene Counseling:  I discussed with the patient the risks of bexarotene including but not limited to hair loss, dry lips/skin/eyes, liver abnormalities, hyperlipidemia, pancreatitis, depression/suicidal ideation, photosensitivity, drug rash/allergic reactions, hypothyroidism, anemia, leukopenia, infection, cataracts, and teratogenicity.  Patient understands that they will need regular blood tests to check lipid profile, liver function tests, white blood cell count, thyroid function tests and pregnancy test if applicable.

## 2023-11-13 NOTE — PROGRESS NOTES
Assessment & Plan   Problem List Items Addressed This Visit       Idiopathic edema     Continue triamterene-hydrochlorothiazide.  BMP today.         Relevant Medications    triamterene-HCTZ (MAXZIDE) 75-50 MG tablet    Other Relevant Orders    Basic metabolic panel    EVELYN (generalized anxiety disorder) - Primary     Stable.  She notes difficulty splitting the pill in order to create 75 mg, we will increase the dose to 100 mg for ease of dosing the medication         Relevant Medications    sertraline (ZOLOFT) 100 MG tablet    Dysthymia     Stable         Relevant Medications    sertraline (ZOLOFT) 100 MG tablet    Seborrheic dermatitis of scalp     Ketoconazole as needed         Relevant Medications    ketoconazole (NIZORAL) 2 % external shampoo     Other Visit Diagnoses       Other fatigue        TSH and CBC today to evaluate for organic cause of fatigue.    Relevant Orders    TSH with free T4 reflex    CBC with platelets           -She declines an influenza vaccine, she would prefer not to get this on the same day as COVID  -COVID-vaccine today    Nelly Haque NP  [facility]    Placido Agosto is a 48 year old, presenting for the following health issues:  Recheck Medication        11/13/2023     8:47 AM   Additional Questions   Roomed by VILMA Vizcaino   Accompanied by devon       History of Present Illness       Reason for visit:  Check up    She eats 2-3 servings of fruits and vegetables daily.She consumes 1 sweetened beverage(s) daily.She exercises with enough effort to increase her heart rate 30 to 60 minutes per day.  She exercises with enough effort to increase her heart rate 4 days per week.   She is taking medications regularly.     She feels tired. After the hysterectomy noticed a change. Sleep patterns are unchanged. When she gets home after work she feels drained. Wakes up at 4:30 2 days of the week to go to a workout class at [employer].         2/28/2022    12:03 PM  12/5/2022     2:13 PM 11/12/2023    12:25 PM   PHQ   PHQ-9 Total Score 3 0 5   Q9: Thoughts of better off dead/self-harm past 2 weeks Not at all Not at all Not at all         2/20/2020     9:00 AM 2/28/2022    12:04 PM 11/12/2023    12:26 PM   EVELYN-7 SCORE   Total Score  5 (mild anxiety) 5 (mild anxiety)   Total Score 8 5 5     She notes an increased anxiety associated with situational events.  She is currently taking sertraline 75 mg, it is difficult to split the 25 mg tablet she frequently loses the other half.          Objective    /60 (BP Location: Right arm, Patient Position: Sitting, Cuff Size: Adult Regular)   Pulse 70   SpO2 99%   There is no height or weight on file to calculate BMI.  Physical Exam   GENERAL: healthy, alert and no distress  RESP: lungs clear to auscultation - no rales, rhonchi or wheezes  CV: regular rate and rhythm, normal S1 S2, no S3 or S4, no murmur  PSYCH: mentation appears normal, affect normal/bright

## 2023-12-11 ENCOUNTER — PATIENT OUTREACH (OUTPATIENT)
Dept: CARE COORDINATION | Facility: CLINIC | Age: 48
End: 2023-12-11
Payer: COMMERCIAL

## 2024-01-08 ENCOUNTER — PATIENT OUTREACH (OUTPATIENT)
Dept: CARE COORDINATION | Facility: CLINIC | Age: 49
End: 2024-01-08
Payer: COMMERCIAL

## 2024-04-08 SDOH — HEALTH STABILITY: PHYSICAL HEALTH: ON AVERAGE, HOW MANY MINUTES DO YOU ENGAGE IN EXERCISE AT THIS LEVEL?: 60 MIN

## 2024-04-08 SDOH — HEALTH STABILITY: PHYSICAL HEALTH: ON AVERAGE, HOW MANY DAYS PER WEEK DO YOU ENGAGE IN MODERATE TO STRENUOUS EXERCISE (LIKE A BRISK WALK)?: 4 DAYS

## 2024-04-08 ASSESSMENT — ANXIETY QUESTIONNAIRES
4. TROUBLE RELAXING: SEVERAL DAYS
8. IF YOU CHECKED OFF ANY PROBLEMS, HOW DIFFICULT HAVE THESE MADE IT FOR YOU TO DO YOUR WORK, TAKE CARE OF THINGS AT HOME, OR GET ALONG WITH OTHER PEOPLE?: SOMEWHAT DIFFICULT
7. FEELING AFRAID AS IF SOMETHING AWFUL MIGHT HAPPEN: NOT AT ALL
2. NOT BEING ABLE TO STOP OR CONTROL WORRYING: NOT AT ALL
5. BEING SO RESTLESS THAT IT IS HARD TO SIT STILL: SEVERAL DAYS
GAD7 TOTAL SCORE: 5
IF YOU CHECKED OFF ANY PROBLEMS ON THIS QUESTIONNAIRE, HOW DIFFICULT HAVE THESE PROBLEMS MADE IT FOR YOU TO DO YOUR WORK, TAKE CARE OF THINGS AT HOME, OR GET ALONG WITH OTHER PEOPLE: SOMEWHAT DIFFICULT
3. WORRYING TOO MUCH ABOUT DIFFERENT THINGS: SEVERAL DAYS
1. FEELING NERVOUS, ANXIOUS, OR ON EDGE: SEVERAL DAYS
6. BECOMING EASILY ANNOYED OR IRRITABLE: SEVERAL DAYS
GAD7 TOTAL SCORE: 5
7. FEELING AFRAID AS IF SOMETHING AWFUL MIGHT HAPPEN: NOT AT ALL

## 2024-04-08 ASSESSMENT — SOCIAL DETERMINANTS OF HEALTH (SDOH): HOW OFTEN DO YOU GET TOGETHER WITH FRIENDS OR RELATIVES?: ONCE A WEEK

## 2024-04-09 ENCOUNTER — OFFICE VISIT (OUTPATIENT)
Dept: INTERNAL MEDICINE | Facility: CLINIC | Age: 49
End: 2024-04-09
Payer: COMMERCIAL

## 2024-04-09 VITALS
SYSTOLIC BLOOD PRESSURE: 104 MMHG | DIASTOLIC BLOOD PRESSURE: 68 MMHG | OXYGEN SATURATION: 98 % | HEART RATE: 68 BPM | RESPIRATION RATE: 18 BRPM

## 2024-04-09 DIAGNOSIS — R41.840 INATTENTION: ICD-10-CM

## 2024-04-09 DIAGNOSIS — L21.9 SEBORRHEIC DERMATITIS OF SCALP: ICD-10-CM

## 2024-04-09 DIAGNOSIS — Z12.31 VISIT FOR SCREENING MAMMOGRAM: ICD-10-CM

## 2024-04-09 DIAGNOSIS — F41.1 GAD (GENERALIZED ANXIETY DISORDER): ICD-10-CM

## 2024-04-09 DIAGNOSIS — F34.1 DYSTHYMIA: ICD-10-CM

## 2024-04-09 DIAGNOSIS — Z00.00 ROUTINE GENERAL MEDICAL EXAMINATION AT A HEALTH CARE FACILITY: Primary | ICD-10-CM

## 2024-04-09 PROCEDURE — 99213 OFFICE O/P EST LOW 20 MIN: CPT | Mod: 25 | Performed by: NURSE PRACTITIONER

## 2024-04-09 PROCEDURE — 99396 PREV VISIT EST AGE 40-64: CPT | Performed by: NURSE PRACTITIONER

## 2024-04-09 PROCEDURE — 96127 BRIEF EMOTIONAL/BEHAV ASSMT: CPT | Performed by: NURSE PRACTITIONER

## 2024-04-09 RX ORDER — BUPROPION HYDROCHLORIDE 150 MG/1
150 TABLET ORAL EVERY MORNING
Qty: 30 TABLET | Refills: 1 | Status: SHIPPED | OUTPATIENT
Start: 2024-04-09 | End: 2024-05-14

## 2024-04-09 RX ORDER — TRIAMTERENE CAPSULES 50 MG/1
CAPSULE ORAL
COMMUNITY
Start: 2022-08-30

## 2024-04-09 ASSESSMENT — PATIENT HEALTH QUESTIONNAIRE - PHQ9
SUM OF ALL RESPONSES TO PHQ QUESTIONS 1-9: 8
SUM OF ALL RESPONSES TO PHQ QUESTIONS 1-9: 8
10. IF YOU CHECKED OFF ANY PROBLEMS, HOW DIFFICULT HAVE THESE PROBLEMS MADE IT FOR YOU TO DO YOUR WORK, TAKE CARE OF THINGS AT HOME, OR GET ALONG WITH OTHER PEOPLE: SOMEWHAT DIFFICULT

## 2024-04-09 ASSESSMENT — ENCOUNTER SYMPTOMS: NERVOUS/ANXIOUS: 1

## 2024-04-09 NOTE — PATIENT INSTRUCTIONS
Preventive Care Advice   This is general advice given by our system to help you stay healthy. However, your care team may have specific advice just for you. Please talk to your care team about your preventive care needs.  Nutrition  Eat 5 or more servings of fruits and vegetables each day.  Try wheat bread, brown rice and whole grain pasta (instead of white bread, rice, and pasta).  Get enough calcium and vitamin D. Check the label on foods and aim for 100% of the RDA (recommended daily allowance).  Lifestyle  Exercise at least 150 minutes each week   (30 minutes a day, 5 days a week).  Do muscle strengthening activities 2 days a week. These help control your weight and prevent disease.  No smoking.  Wear sunscreen to prevent skin cancer.  Have a dental exam and cleaning every 6 months.  Yearly exams  See your health care team every year to talk about:  Any changes in your health.  Any medicines your care team has prescribed.  Preventive care, family planning, and ways to prevent chronic diseases.  Shots (vaccines)   HPV shots (up to age 26), if you've never had them before.  Hepatitis B shots (up to age 59), if you've never had them before.  COVID-19 shot: Get this shot when it's due.  Flu shot: Get a flu shot every year.  Tetanus shot: Get a tetanus shot every 10 years.  Pneumococcal, hepatitis A, and RSV shots: Ask your care team if you need these based on your risk.  Shingles shot (for age 50 and up).  General health tests  Diabetes screening:  Starting at age 35, Get screened for diabetes at least every 3 years.  If you are younger than age 35, ask your care team if you should be screened for diabetes.  Cholesterol test: At age 39, start having a cholesterol test every 5 years, or more often if advised.  Bone density scan (DEXA): At age 50, ask your care team if you should have this scan for osteoporosis (brittle bones).  Hepatitis C: Get tested at least once in your life.  STIs (sexually transmitted  infections)  Before age 24: Ask your care team if you should be screened for STIs.  After age 24: Get screened for STIs if you're at risk. You are at risk for STIs (including HIV) if:  You are sexually active with more than one person.  You don't use condoms every time.  You or a partner was diagnosed with a sexually transmitted infection.  If you are at risk for HIV, ask about PrEP medicine to prevent HIV.  Get tested for HIV at least once in your life, whether you are at risk for HIV or not.  Cancer screening tests  Cervical cancer screening: If you have a cervix, begin getting regular cervical cancer screening tests at age 21. Most people who have regular screenings with normal results can stop after age 65. Talk about this with your provider.  Breast cancer scan (mammogram): If you've ever had breasts, begin having regular mammograms starting at age 40. This is a scan to check for breast cancer.  Colon cancer screening: It is important to start screening for colon cancer at age 45.  Have a colonoscopy test every 10 years (or more often if you're at risk) Or, ask your provider about stool tests like a FIT test every year or Cologuard test every 3 years.  To learn more about your testing options, visit: https://www.[employer]/497027.pdf.  For help making a decision, visit: https://bit.ly/yt39310.  Prostate cancer screening test: If you have a prostate and are age 55 to 69, ask your provider if you would benefit from a yearly prostate cancer screening test.  Lung cancer screening: If you are a current or former smoker age 50 to 80, ask your care team if ongoing lung cancer screenings are right for you.  For informational purposes only. Not to replace the advice of your health care provider. Copyright   2023 [employer]. All rights reserved. Clinically reviewed by the [facility]. [employer] 275539 - REV 01/24.    Learning About Stress  What is stress?     Stress is your  body's response to a hard situation. Your body can have a physical, emotional, or mental response. Stress is a fact of life for most people, and it affects everyone differently. What causes stress for you may not be stressful for someone else.  A lot of things can cause stress. You may feel stress when you go on a job interview, take a test, or run a race. This kind of short-term stress is normal and even useful. It can help you if you need to work hard or react quickly. For example, stress can help you finish an important job on time.  Long-term stress is caused by ongoing stressful situations or events. Examples of long-term stress include long-term health problems, ongoing problems at work, or conflicts in your family. Long-term stress can harm your health.  How does stress affect your health?  When you are stressed, your body responds as though you are in danger. It makes hormones that speed up your heart, make you breathe faster, and give you a burst of energy. This is called the fight-or-flight stress response. If the stress is over quickly, your body goes back to normal and no harm is done.  But if stress happens too often or lasts too long, it can have bad effects. Long-term stress can make you more likely to get sick, and it can make symptoms of some diseases worse. If you tense up when you are stressed, you may develop neck, shoulder, or low back pain. Stress is linked to high blood pressure and heart disease.  Stress also harms your emotional health. It can make you segovia, tense, or depressed. Your relationships may suffer, and you may not do well at work or school.  What can you do to manage stress?  You can try these things to help manage stress:   Do something active. Exercise or activity can help reduce stress. Walking is a great way to get started. Even everyday activities such as housecleaning or yard work can help.  Try yoga or faizan chi. These techniques combine exercise and meditation. You may need  some training at first to learn them.  Do something you enjoy. For example, listen to music or go to a movie. Practice your hobby or do volunteer work.  Meditate. This can help you relax, because you are not worrying about what happened before or what may happen in the future.  Do guided imagery. Imagine yourself in any setting that helps you feel calm. You can use online videos, books, or a teacher to guide you.  Do breathing exercises. For example:  From a standing position, bend forward from the waist with your knees slightly bent. Let your arms dangle close to the floor.  Breathe in slowly and deeply as you return to a standing position. Roll up slowly and lift your head last.  Hold your breath for just a few seconds in the standing position.  Breathe out slowly and bend forward from the waist.  Let your feelings out. Talk, laugh, cry, and express anger when you need to. Talking with supportive friends or family, a counselor, or a carmel leader about your feelings is a healthy way to relieve stress. Avoid discussing your feelings with people who make you feel worse.  Write. It may help to write about things that are bothering you. This helps you find out how much stress you feel and what is causing it. When you know this, you can find better ways to cope.  What can you do to prevent stress?  You might try some of these things to help prevent stress:  Manage your time. This helps you find time to do the things you want and need to do.  Get enough sleep. Your body recovers from the stresses of the day while you are sleeping.  Get support. Your family, friends, and community can make a difference in how you experience stress.  Limit your news feed. Avoid or limit time on social media or news that may make you feel stressed.  Do something active. Exercise or activity can help reduce stress. Walking is a great way to get started.  Where can you learn more?  Go to https://www.healthwise.net/patiented  Enter N032 in the  "search box to learn more about \"Learning About Stress.\"  Current as of: October 24, 2023               Content Version: 14.0    4787-6312 [employer].   Care instructions adapted under license by your healthcare professional. If you have questions about a medical condition or this instruction, always ask your healthcare professional. [employer] disclaims any warranty or liability for your use of this information.      Learning About Depression Screening  What is depression screening?  Depression screening is a way to see if you have depression symptoms. It may be done by a doctor or counselor. It's often part of a routine checkup. That's because your mental health is just as important as your physical health.  Depression is a mental health condition that affects how you feel, think, and act. You may:  Have less energy.  Lose interest in your daily activities.  Feel sad and grouchy for a long time.  Depression is very common. It affects people of all ages.  Many things can lead to depression. Some people become depressed after they have a stroke or find out they have a major illness like cancer or heart disease. The death of a loved one or a breakup may lead to depression. It can run in families. Most experts believe that a combination of inherited genes and stressful life events can cause it.  What happens during screening?  You may be asked to fill out a form about your depression symptoms. You and the doctor will discuss your answers. The doctor may ask you more questions to learn more about how you think, act, and feel.  What happens after screening?  If you have symptoms of depression, your doctor will talk to you about your options.  Doctors usually treat depression with medicines or counseling. Often, combining the two works best. Many people don't get help because they think that they'll get over the depression on their own. But people with depression may not get better unless they " "get treatment.  The cause of depression is not well understood. There may be many factors involved. But if you have depression, it's not your fault.  A serious symptom of depression is thinking about death or suicide. If you or someone you care about talks about this or about feeling hopeless, get help right away.  It's important to know that depression can be treated. Medicine, counseling, and self-care may help.  Where can you learn more?  Go to https://www.[employer]IQ.net/patiented  Enter T185 in the search box to learn more about \"Learning About Depression Screening.\"  Current as of: June 24, 2023               Content Version: 14.0    9881-2817 [employer].   Care instructions adapted under license by your healthcare professional. If you have questions about a medical condition or this instruction, always ask your healthcare professional. [employer] disclaims any warranty or liability for your use of this information.      "

## 2024-04-09 NOTE — PROGRESS NOTES
Preventive Care Visit  [facility]  Nelly Haque NP  Apr 9, 2024      Assessment & Plan   Problem List Items Addressed This Visit       EVELYN (generalized anxiety disorder)    Relevant Medications    buPROPion (WELLBUTRIN XL) 150 MG 24 hr tablet    Dysthymia    Relevant Medications    buPROPion (WELLBUTRIN XL) 150 MG 24 hr tablet    Seborrheic dermatitis of scalp     Continue ketoconazole shampoo           Other Visit Diagnoses       Routine general medical examination at a health care facility    -  Primary    Inattention        Relevant Orders    Adult Mental Health  Referral    Visit for screening mammogram        Relevant Orders    *MA Screening Digital Bilateral           - she has inattention and difficulty with focus/completing tasks. Reviewed the possible association with EVELYN/depression as well as ADHD and thus recommended a diagnostic assessment vs augmentation of EVELYN/depression treatment with bupropion. She would prefer to start with the latter and then if still struggling with attention, consider diagnostic assessment  - START: bupropion 150 mg daily. Reviewed possible side effects  - Follow up in 4-6 weeks        Counseling  Appropriate preventive services were discussed with this patient, including applicable screening as appropriate for fall prevention, nutrition, physical activity, Tobacco-use cessation, weight loss and cognition.  Checklist reviewing preventive services available has been given to the patient.  Reviewed patient's diet, addressing concerns and/or questions.   The patient's PHQ-9 score is consistent with mild depression. She was provided with information regarding depression.           Placido Agosto is a 48 year old, presenting for the following:  Physical and Anxiety        4/9/2024     7:57 AM   Additional Questions   Roomed by Samina Bey   Accompanied by N/A        Health Care Directive  Patient does not have a Health Care Directive or Living Will:  Discussed advance care planning with patient; information given to patient to review.    Anxiety    Her daughter recently went through an evaluation for ADHD and she has completely turned a corner with her studies. Triny has noticed some similar patterns. She recalls struggling with school, part of it was associated with having dyslexia but she also wonders if ADHD was part of her struggles. She is also scattered when she is at work. At home she is jumping from task to task and has burned food because she is trying to do too many tasks at one home.     Answers submitted by the patient for this visit:  Patient Health Questionnaire (Submitted on 4/9/2024)  If you checked off any problems, how difficult have these problems made it for you to do your work, take care of things at home, or get along with other people?: Somewhat difficult  PHQ9 TOTAL SCORE: 8  EVELYN-7 (Submitted on 4/8/2024)  EVELYN 7 TOTAL SCORE: 5        4/8/2024   General Health   How would you rate your overall physical health? Good   Feel stress (tense, anxious, or unable to sleep) To some extent   (!) STRESS CONCERN      4/8/2024   Nutrition   Three or more servings of calcium each day? Yes   Diet: Regular (no restrictions)   How many servings of fruit and vegetables per day? (!) 2-3   How many sweetened beverages each day? 0-1         4/8/2024   Exercise   Days per week of moderate/strenous exercise 4 days   Average minutes spent exercising at this level 60 min         4/8/2024   Social Factors   Frequency of gathering with friends or relatives Once a week   Worry food won't last until get money to buy more No   Food not last or not have enough money for food? No   Do you have housing?  Yes   Are you worried about losing your housing? No   Lack of transportation? No   Unable to get utilities (heat,electricity)? No         4/8/2024   Dental   Dentist two times every year? Yes         4/8/2024   TB Screening   Were you born outside of the [place]? No        Today's PHQ-9 Score:       4/9/2024     7:53 AM   PHQ-9 SCORE   PHQ-9 Total Score MyChart 8 (Mild depression)   PHQ-9 Total Score 8         4/8/2024   Substance Use   Alcohol more than 3/day or more than 7/wk No   Do you use any other substances recreationally? No     Social History     Tobacco Use    Smoking status: Never    Smokeless tobacco: Never   Vaping Use    Vaping Use: Never used   Substance Use Topics    Drug use: No           1/9/2023   LAST FHS-7 RESULTS   1st degree relative breast or ovarian cancer No   Any relative bilateral breast cancer No   Any male have breast cancer No   Any ONE woman have BOTH breast AND ovarian cancer No   Any woman with breast cancer before 50yrs No   2 or more relatives with breast AND/OR ovarian cancer No   2 or more relatives with breast AND/OR bowel cancer No             4/8/2024   STI Screening   New sexual partner(s) since last STI/HIV test? No           Latest Ref Rng & Units 2/20/2020     9:39 AM   PAP / HPV   PAP Negative for squamous intraepithelial lesion or malignancy. Negative for squamous intraepithelial lesion or malignancy  Electronically signed by Gladis Coats CT (ASCP) on 2/26/2020 at  2:21 PM      HPV 16 DNA NEG Negative    HPV 18 DNA NEG Negative    Other HR HPV NEG Negative      ASCVD Risk   The 10-year ASCVD risk score (Jeanne SEAY, et al., 2019) is: 0.6%    Values used to calculate the score:      Age: 48 years      Sex: Female      Is Non- : No      Diabetic: No      Tobacco smoker: No      Systolic Blood Pressure: 104 mmHg      Is BP treated: No      HDL Cholesterol: 65 mg/dL      Total Cholesterol: 199 mg/dL    Reviewed and updated as needed this visit by Provider   Tobacco  Allergies  Meds  Problems  Med Hx  Surg Hx  Fam Hx               Objective    Exam  /68 (BP Location: Right arm, Patient Position: Sitting, Cuff Size: Adult Regular)   Pulse 68   Resp 18   SpO2 98%    Estimated body  "mass index is 23.83 kg/m  as calculated from the following:    Height as of 2/28/22: 1.64 m (5' 4.57\").    Weight as of 3/17/22: 64.1 kg (141 lb 4.8 oz).    Physical Exam  GENERAL: alert and no distress  EYES: Eyes grossly normal to inspection, conjunctivae and sclerae normal  HENT: ear canals and TM's normal, mouth without ulcers or lesions  NECK: no adenopathy, no asymmetry, masses, or scars  RESP: lungs clear to auscultation - no rales, rhonchi or wheezes  CV: regular rate and rhythm, normal S1 S2, no S3 or S4, no murmur, click or rub, no peripheral edema  ABDOMEN: soft, nontender, no hepatosplenomegaly, no masses and bowel sounds normal  NEURO: Normal strength and tone, mentation intact and speech normal  PSYCH: mentation appears normal, affect normal/bright        Signed Electronically by: Nelly Haque NP      "

## 2024-05-13 DIAGNOSIS — F41.1 GAD (GENERALIZED ANXIETY DISORDER): ICD-10-CM

## 2024-05-14 RX ORDER — BUPROPION HYDROCHLORIDE 150 MG/1
150 TABLET ORAL EVERY MORNING
Qty: 30 TABLET | Refills: 1 | Status: SHIPPED | OUTPATIENT
Start: 2024-05-14

## 2024-08-26 ENCOUNTER — MYC MEDICAL ADVICE (OUTPATIENT)
Dept: INTERNAL MEDICINE | Facility: CLINIC | Age: 49
End: 2024-08-26
Payer: COMMERCIAL

## 2024-08-27 DIAGNOSIS — K64.5 THROMBOSED EXTERNAL HEMORRHOIDS: Primary | ICD-10-CM

## 2024-08-28 ENCOUNTER — TELEPHONE (OUTPATIENT)
Dept: SURGERY | Facility: CLINIC | Age: 49
End: 2024-08-28

## 2024-08-28 ENCOUNTER — OFFICE VISIT (OUTPATIENT)
Dept: SURGERY | Facility: CLINIC | Age: 49
End: 2024-08-28
Attending: NURSE PRACTITIONER
Payer: COMMERCIAL

## 2024-08-28 VITALS
HEART RATE: 75 BPM | HEIGHT: 65 IN | WEIGHT: 137.2 LBS | DIASTOLIC BLOOD PRESSURE: 69 MMHG | SYSTOLIC BLOOD PRESSURE: 101 MMHG | OXYGEN SATURATION: 100 % | BODY MASS INDEX: 22.86 KG/M2

## 2024-08-28 DIAGNOSIS — K64.5 THROMBOSED EXTERNAL HEMORRHOIDS: ICD-10-CM

## 2024-08-28 PROCEDURE — 99202 OFFICE O/P NEW SF 15 MIN: CPT

## 2024-08-28 ASSESSMENT — PAIN SCALES - GENERAL: PAINLEVEL: MILD PAIN (3)

## 2024-08-28 NOTE — PATIENT INSTRUCTIONS
Start a daily fiber supplement such as Citrucel or Metamucil. Start with once a day and slowly increase up to three times a day, if needed, over the next 4-6 weeks     Take ibuprofen 600 mg four times a day   Take this on a regular basis (not as needed).   The drugs are best taken with food.  Do not take if it causes stomach upset or if you have a history of ulcers or gastritis. You can stop the ibuprofen or reduce the dose when you are feeling better.  DO NOT use naprosyn, ibuprofen, or other similar agents (eg. Advil or Aleve) if you have inflammatory bowel disease (Ulcerative Colitis or Crohn's disease) or if your doctor as advised you against using these medications    Take acetominaphen (Tylenol) 650-1000 mg four times a day.   Take this on a regular basis (not as needed) for pain control.   Take the lower dose if you are >65 years old or have liver disease. The maximum dose of acetominaphen is 4000 mg a day. You can stop the acetaminophen or reduce the dose when you are feeling better.  It is important to realize that many narcotic pain relievers (including vicodin, percocet, tylenol #3) also have acetaminophen, and excessive doses of acetaminophen can be dangerous, so do not take these in addition to acetominaphen.  You may take narcotics that don't contain acetominaphen such as oxycodone.

## 2024-08-28 NOTE — NURSING NOTE
"Chief Complaint   Patient presents with    Consult     Thrombosed hemorrhoid       Vitals:    08/28/24 1427   BP: 101/69   BP Location: Left arm   Patient Position: Sitting   Cuff Size: Adult Regular   Pulse: 75   SpO2: 100%   Weight: 137 lb 3.2 oz   Height: 5' 4.5\"       Body mass index is 23.19 kg/m .    Marcie Liu, EMT  "

## 2024-08-28 NOTE — PROGRESS NOTES
"Colon and Rectal Surgery Consult Clinic Note    Date: 2024     Referring provider:  Nelly Haque NP  [address]  [place] [address]     RE: Triny Corcoran  : 1975  NIKKY: 2024    Triny Corcoran is a very pleasant 49 year old female here for possible thrombosed external hemorrhoid. Patient states Friday is when her symptoms started- having 10/10 pain and a large hemorrhoid that was firm to the touch. Patient states she went into an urgent care  and the provider incised the hemorrhoid and told her to follow up with CRS. Patient reports her pain is 2/10 currently, she did take some ibuprofen. She reports she feels the area is softer and pain overall is improved.     Physical Examination:  /69 (BP Location: Left arm, Patient Position: Sitting, Cuff Size: Adult Regular)   Pulse 75   Ht 5' 4.5\"   Wt 137 lb 3.2 oz   SpO2 100%   BMI 23.19 kg/m    General: alert and sitting comfortably   HEENT: moist mucus membranes     Perianal external examination: Exam was chaperoned by RONEY Montague   Thrombosed external hemorrhoid in right lateral position - soft to touch. No erythema. Mildly tender on exam. No drainage. Incision site from previous I&D done at the urgent care is on the anterior portion of the thrombosed external hemorrhoid. No drainage.     Digital rectal examination: Was deferred    Anoscopy: Was deferred      Assessment/Plan: 49 year old female with thrombosed external hemorrhoid in right lateral position. Appears to be healing well. Soft to touch. Patient is having decreased pain. Discussed with patient since this is day 5 and her symptoms are improving, I would not recommend excision. Discussed that this will resolve on its own. Continue with Ibuprofen 600 mg four times a day with tylenol 650 mg in between along with stiz baths to help with pain management. Also encouraged her to take metamucil or citrucel one tablespoon once daily. Will have her follow up in " two week, discussed with the patient that if things are completely resolved she can cancel the appointment. Patient is agreeable to this plan. Answered all her questions to her satisfaction. Encouraged to reach out with any additional questions or concerns.       Medical history:  Past Medical History:   Diagnosis Date    Anxiety     H/O: depression     Idiopathic edema     PONV (postoperative nausea and vomiting)     S/P colonoscopy 10/17/2011       Surgical history:  Past Surgical History:   Procedure Laterality Date    HYSTERECTOMY VAGINAL, SALPINGECTOMY Bilateral 03/17/2022    Procedure: TOTAL VAGINAL HYSTERECTOMY BILATERAL SALINGECTOMY;  Surgeon: Bozena Lovett MD;  Location: [facility] OR    HYSTERECTOMY, PAP NO LONGER INDICATED      TONSILLECTOMY         Problem list:    Patient Active Problem List    Diagnosis Date Noted    EVELYN (generalized anxiety disorder) 09/12/2016     Priority: Medium    Dysthymia 09/12/2016     Priority: Medium    Seborrheic dermatitis of scalp 09/12/2016     Priority: Medium    Idiopathic edema 03/17/2016     Priority: Low       Medications:  Current Outpatient Medications   Medication Sig Dispense Refill    buPROPion (WELLBUTRIN XL) 150 MG 24 hr tablet TAKE 1 TABLET(150 MG) BY MOUTH EVERY MORNING 30 tablet 1    ketoconazole (NIZORAL) 2 % external shampoo Apply topically daily as needed for itching or irritation 120 mL 5    sertraline (ZOLOFT) 100 MG tablet Take 1 tablet (100 mg) by mouth daily 90 tablet 3    triamterene (DYRENIUM) 50 MG capsule take 1.5 tablets by oral route  every day      triamterene-HCTZ (MAXZIDE) 75-50 MG tablet Take 1 tablet by mouth daily 90 tablet 3    LORazepam (ATIVAN) 0.5 MG tablet [LORAZEPAM (ATIVAN) 0.5 MG TABLET] Take 1 tablet (0.5 mg total) by mouth every 8 (eight) hours as needed for anxiety. (Patient not taking: Reported on 4/9/2024) 15 tablet 0       Allergies:  No Known Allergies    Family history:  Family History   Problem Relation Age of  "Onset    Melanoma Mother     Diabetes Father     No Known Problems Brother     No Known Problems Son     No Known Problems Daughter     Coronary Artery Disease Maternal Uncle 50.00    Cerebrovascular Disease Maternal Uncle 60.00       Social history:  Social History     Tobacco Use    Smoking status: Never    Smokeless tobacco: Never   Substance Use Topics    Alcohol use: Not on file     Comment: Alcoholic Drinks/day: rare, less than 1 drink per week     Marital status: .    Nursing Notes:   Marcie Liu  8/28/2024  2:30 PM  Signed  Chief Complaint   Patient presents with    Consult     Thrombosed hemorrhoid       Vitals:    08/28/24 1427   BP: 101/69   BP Location: Left arm   Patient Position: Sitting   Cuff Size: Adult Regular   Pulse: 75   SpO2: 100%   Weight: 137 lb 3.2 oz   Height: 5' 4.5\"       Body mass index is 23.19 kg/m .    RONEY Montague     20 minutes spent on the date of encounter performing chart review, history and exam, documentation and further activities as noted above.     Cathi Sebastian PA-C  Colon and Rectal Surgery   [facility]    This note was created using speech recognition software and may contain unintended word substitutions.   "

## 2024-08-28 NOTE — TELEPHONE ENCOUNTER
[facility] Call Center    Phone Message    May a detailed message be left on voicemail: yes     Reason for Call: Other: Patient called to schedule for referral DX: Thrombosed external hemorrhoids.  Patient stated she has had severe anal pain in the last 72 hours.  Please follow up with patient.     Action Taken: Message routed to:  [facility]    Travel Screening: Not Applicable

## 2024-08-28 NOTE — TELEPHONE ENCOUNTER
S/p excision of thrombosed hemorrhoid on 8/25 in ED. States that she feels worse since the procedure. Reports that the hemorrhoid is larger and more painful. Discussed with Rocio Sorenson NP. Could possibly repeat procedure. Told patient to come in right now for this. She will call me back to see if she can get a ride.

## 2024-08-28 NOTE — LETTER
"2024       RE: Triny Corcoran  [address]  [facility] [address]     Dear Colleague,    Thank you for referring your patient, Triny Corcoran, to the [facility] COLON AND RECTAL [facility] [place] [facility]. Please see a copy of my visit note below.    Colon and Rectal Surgery Consult Clinic Note    Date: 2024     Referring provider:  Nelly Haque NP  [address]  [place] [address]     RE: Triny Corcoran  : 1975  NIKKY: 2024    Triny Corcoran is a very pleasant 49 year old female here for possible thrombosed external hemorrhoid. Patient states Friday is when her symptoms started- having 10/10 pain and a large hemorrhoid that was firm to the touch. Patient states she went into an urgent care  and the provider incised the hemorrhoid and told her to follow up with CRS. Patient reports her pain is 2/10 currently, she did take some ibuprofen. She reports she feels the area is softer and pain overall is improved.     Physical Examination:  /69 (BP Location: Left arm, Patient Position: Sitting, Cuff Size: Adult Regular)   Pulse 75   Ht 5' 4.5\"   Wt 137 lb 3.2 oz   SpO2 100%   BMI 23.19 kg/m    General: alert and sitting comfortably   HEENT: moist mucus membranes     Perianal external examination: Exam was chaperoned by RONEY Montague   Thrombosed external hemorrhoid in right lateral position - soft to touch. No erythema. Mildly tender on exam. No drainage. Incision site from previous I&D done at the urgent care is on the anterior portion of the thrombosed external hemorrhoid. No drainage.     Digital rectal examination: Was deferred    Anoscopy: Was deferred      Assessment/Plan: 49 year old female with thrombosed external hemorrhoid in right lateral position. Appears to be healing well. Soft to touch. Patient is having decreased pain. Discussed with patient since this is day 5 and her symptoms are " improving, I would not recommend excision. Discussed that this will resolve on its own. Continue with Ibuprofen 600 mg four times a day with tylenol 650 mg in between along with stiz baths to help with pain management. Also encouraged her to take metamucil or citrucel one tablespoon once daily. Will have her follow up in two week, discussed with the patient that if things are completely resolved she can cancel the appointment. Patient is agreeable to this plan. Answered all her questions to her satisfaction. Encouraged to reach out with any additional questions or concerns.       Medical history:  Past Medical History:   Diagnosis Date     Anxiety      H/O: depression      Idiopathic edema      PONV (postoperative nausea and vomiting)      S/P colonoscopy 10/17/2011       Surgical history:  Past Surgical History:   Procedure Laterality Date     HYSTERECTOMY VAGINAL, SALPINGECTOMY Bilateral 03/17/2022    Procedure: TOTAL VAGINAL HYSTERECTOMY BILATERAL SALINGECTOMY;  Surgeon: Bozena Lovett MD;  Location: [facility]     HYSTERECTOMY, PAP NO LONGER INDICATED       TONSILLECTOMY         Problem list:    Patient Active Problem List    Diagnosis Date Noted     EVELYN (generalized anxiety disorder) 09/12/2016     Priority: Medium     Dysthymia 09/12/2016     Priority: Medium     Seborrheic dermatitis of scalp 09/12/2016     Priority: Medium     Idiopathic edema 03/17/2016     Priority: Low       Medications:  Current Outpatient Medications   Medication Sig Dispense Refill     buPROPion (WELLBUTRIN XL) 150 MG 24 hr tablet TAKE 1 TABLET(150 MG) BY MOUTH EVERY MORNING 30 tablet 1     ketoconazole (NIZORAL) 2 % external shampoo Apply topically daily as needed for itching or irritation 120 mL 5     sertraline (ZOLOFT) 100 MG tablet Take 1 tablet (100 mg) by mouth daily 90 tablet 3     triamterene (DYRENIUM) 50 MG capsule take 1.5 tablets by oral route  every day       triamterene-HCTZ (MAXZIDE) 75-50 MG tablet Take 1  "tablet by mouth daily 90 tablet 3     LORazepam (ATIVAN) 0.5 MG tablet [LORAZEPAM (ATIVAN) 0.5 MG TABLET] Take 1 tablet (0.5 mg total) by mouth every 8 (eight) hours as needed for anxiety. (Patient not taking: Reported on 4/9/2024) 15 tablet 0       Allergies:  No Known Allergies    Family history:  Family History   Problem Relation Age of Onset     Melanoma Mother      Diabetes Father      No Known Problems Brother      No Known Problems Son      No Known Problems Daughter      Coronary Artery Disease Maternal Uncle 50.00     Cerebrovascular Disease Maternal Uncle 60.00       Social history:  Social History     Tobacco Use     Smoking status: Never     Smokeless tobacco: Never   Substance Use Topics     Alcohol use: Not on file     Comment: Alcoholic Drinks/day: rare, less than 1 drink per week     Marital status: .    Nursing Notes:   Marcie Liu  8/28/2024  2:30 PM  Signed  Chief Complaint   Patient presents with     Consult     Thrombosed hemorrhoid       Vitals:    08/28/24 1427   BP: 101/69   BP Location: Left arm   Patient Position: Sitting   Cuff Size: Adult Regular   Pulse: 75   SpO2: 100%   Weight: 137 lb 3.2 oz   Height: 5' 4.5\"       Body mass index is 23.19 kg/m .    Marcie Liu, RONEY     20 minutes spent on the date of encounter performing chart review, history and exam, documentation and further activities as noted above.     Cathi Sebastian PA-C  Colon and Rectal Surgery   [facility]    This note was created using speech recognition software and may contain unintended word substitutions.       Again, thank you for allowing me to participate in the care of your patient.      Sincerely,    Cathi Sebastian PA-C    "

## 2024-11-17 DIAGNOSIS — R60.9 IDIOPATHIC EDEMA: ICD-10-CM

## 2024-11-17 DIAGNOSIS — F41.1 GAD (GENERALIZED ANXIETY DISORDER): ICD-10-CM

## 2024-11-17 DIAGNOSIS — F34.1 DYSTHYMIA: ICD-10-CM

## 2024-11-18 DIAGNOSIS — L21.9 SEBORRHEIC DERMATITIS OF SCALP: ICD-10-CM

## 2024-11-18 RX ORDER — TRIAMTERENE AND HYDROCHLOROTHIAZIDE 75; 50 MG/1; MG/1
1 TABLET ORAL DAILY
Qty: 90 TABLET | Refills: 1 | Status: SHIPPED | OUTPATIENT
Start: 2024-11-18

## 2024-11-18 RX ORDER — SERTRALINE HYDROCHLORIDE 100 MG/1
100 TABLET, FILM COATED ORAL DAILY
Qty: 90 TABLET | Refills: 1 | Status: SHIPPED | OUTPATIENT
Start: 2024-11-18

## 2024-11-18 RX ORDER — KETOCONAZOLE 20 MG/ML
SHAMPOO, SUSPENSION TOPICAL DAILY PRN
Qty: 120 ML | Refills: 5 | Status: SHIPPED | OUTPATIENT
Start: 2024-11-18

## 2024-12-10 ENCOUNTER — PATIENT OUTREACH (OUTPATIENT)
Dept: CARE COORDINATION | Facility: CLINIC | Age: 49
End: 2024-12-10
Payer: COMMERCIAL

## 2025-01-07 ENCOUNTER — PATIENT OUTREACH (OUTPATIENT)
Dept: CARE COORDINATION | Facility: CLINIC | Age: 50
End: 2025-01-07
Payer: COMMERCIAL

## 2025-03-10 ENCOUNTER — PATIENT OUTREACH (OUTPATIENT)
Dept: CARE COORDINATION | Facility: CLINIC | Age: 50
End: 2025-03-10
Payer: COMMERCIAL

## 2025-03-22 ENCOUNTER — HEALTH MAINTENANCE LETTER (OUTPATIENT)
Age: 50
End: 2025-03-22

## 2025-03-24 ENCOUNTER — PATIENT OUTREACH (OUTPATIENT)
Dept: CARE COORDINATION | Facility: CLINIC | Age: 50
End: 2025-03-24
Payer: COMMERCIAL

## 2025-05-24 ENCOUNTER — HEALTH MAINTENANCE LETTER (OUTPATIENT)
Age: 50
End: 2025-05-24

## 2025-06-10 ENCOUNTER — MYC MEDICAL ADVICE (OUTPATIENT)
Dept: INTERNAL MEDICINE | Facility: CLINIC | Age: 50
End: 2025-06-10
Payer: COMMERCIAL

## 2025-06-15 DIAGNOSIS — F41.1 GAD (GENERALIZED ANXIETY DISORDER): ICD-10-CM

## 2025-06-15 DIAGNOSIS — R60.9 IDIOPATHIC EDEMA: ICD-10-CM

## 2025-06-15 DIAGNOSIS — F34.1 DYSTHYMIA: ICD-10-CM

## 2025-06-17 RX ORDER — SERTRALINE HYDROCHLORIDE 100 MG/1
100 TABLET, FILM COATED ORAL DAILY
Qty: 90 TABLET | Refills: 1 | Status: SHIPPED | OUTPATIENT
Start: 2025-06-17

## 2025-06-17 RX ORDER — TRIAMTERENE AND HYDROCHLOROTHIAZIDE 75; 50 MG/1; MG/1
1 TABLET ORAL DAILY
Qty: 90 TABLET | Refills: 1 | Status: SHIPPED | OUTPATIENT
Start: 2025-06-17

## 2025-07-31 ENCOUNTER — PATIENT OUTREACH (OUTPATIENT)
Dept: CARE COORDINATION | Facility: CLINIC | Age: 50
End: 2025-07-31
Payer: COMMERCIAL

## (undated) DEVICE — SUCTION TIP YANKAUER W/O VENT K86

## (undated) DEVICE — DECANTER VIAL 2006S

## (undated) DEVICE — GLOVE UNDER INDICATOR PI SZ 6.5 LF 41665

## (undated) DEVICE — PLATE GROUNDING ADULT W/CORD 9165L

## (undated) DEVICE — Device

## (undated) DEVICE — CUSTOM PACK PERI GYN SMA5BPGHEA

## (undated) DEVICE — BRIEF STRETCH XL MPS40

## (undated) DEVICE — GLOVE BIOGEL PI ULTRATOUCH G SZ 6.5 42165

## (undated) DEVICE — GOWN XLG DISP 9545

## (undated) DEVICE — SYR 10ML LL W/O NDL 302995

## (undated) DEVICE — SUTURE VICRYL+ 0 27IN CT-1 UND VCP260H

## (undated) DEVICE — GLOVE PROTEXIS MICRO 5.5  2D73PM55

## (undated) DEVICE — PREP SCRUB SOL EXIDINE 4% CHG 4OZ 29002-404

## (undated) DEVICE — NEEDLE HYPO 22X1-1/2 SAFETY 305900

## (undated) DEVICE — CATH FOLEY 5CC 16FR SIL/LTX 0165V16S

## (undated) DEVICE — SU SILK 2-0 SH 30" K833H

## (undated) DEVICE — SOL NACL 0.9% IRRIG 1000ML BOTTLE 2F7124

## (undated) DEVICE — ESU PENCIL SMOKE EVAC W/ROCKER SWITCH 0703-047-000

## (undated) DEVICE — SYR BULB IRRIG 50ML LATEX FREE 0035280

## (undated) DEVICE — GOWN LG DISP 9515

## (undated) DEVICE — MAT FLOOR SURGICAL 40X38 0702140238

## (undated) DEVICE — PLUG CATHETER AND CAP KENDALL 1600

## (undated) RX ORDER — PHENYLEPHRINE HYDROCHLORIDE 10 MG/ML
INJECTION INTRAVENOUS
Status: DISPENSED
Start: 2022-03-17

## (undated) RX ORDER — LIDOCAINE HYDROCHLORIDE AND EPINEPHRINE 10; 10 MG/ML; UG/ML
INJECTION, SOLUTION INFILTRATION; PERINEURAL
Status: DISPENSED
Start: 2022-03-17